# Patient Record
Sex: MALE | Race: WHITE | NOT HISPANIC OR LATINO | Employment: FULL TIME
[De-identification: names, ages, dates, MRNs, and addresses within clinical notes are randomized per-mention and may not be internally consistent; named-entity substitution may affect disease eponyms.]

---

## 2017-01-30 ENCOUNTER — RECORDS - HEALTHEAST (OUTPATIENT)
Dept: ADMINISTRATIVE | Facility: OTHER | Age: 37
End: 2017-01-30

## 2017-06-02 ENCOUNTER — COMMUNICATION - HEALTHEAST (OUTPATIENT)
Dept: FAMILY MEDICINE | Facility: CLINIC | Age: 37
End: 2017-06-02

## 2017-06-02 DIAGNOSIS — I10 HYPERTENSION: ICD-10-CM

## 2017-06-26 ENCOUNTER — COMMUNICATION - HEALTHEAST (OUTPATIENT)
Dept: FAMILY MEDICINE | Facility: CLINIC | Age: 37
End: 2017-06-26

## 2017-06-26 DIAGNOSIS — E78.5 HYPERLIPEMIA: ICD-10-CM

## 2017-08-02 ENCOUNTER — COMMUNICATION - HEALTHEAST (OUTPATIENT)
Dept: FAMILY MEDICINE | Facility: CLINIC | Age: 37
End: 2017-08-02

## 2017-08-02 DIAGNOSIS — K21.9 GERD (GASTROESOPHAGEAL REFLUX DISEASE): ICD-10-CM

## 2017-08-31 ENCOUNTER — COMMUNICATION - HEALTHEAST (OUTPATIENT)
Dept: FAMILY MEDICINE | Facility: CLINIC | Age: 37
End: 2017-08-31

## 2017-08-31 DIAGNOSIS — I10 HTN (HYPERTENSION): ICD-10-CM

## 2017-09-12 ENCOUNTER — COMMUNICATION - HEALTHEAST (OUTPATIENT)
Dept: FAMILY MEDICINE | Facility: CLINIC | Age: 37
End: 2017-09-12

## 2017-09-12 DIAGNOSIS — I10 HYPERTENSION: ICD-10-CM

## 2017-09-30 ENCOUNTER — COMMUNICATION - HEALTHEAST (OUTPATIENT)
Dept: FAMILY MEDICINE | Facility: CLINIC | Age: 37
End: 2017-09-30

## 2017-09-30 DIAGNOSIS — E78.5 HYPERLIPEMIA: ICD-10-CM

## 2017-10-04 ENCOUNTER — COMMUNICATION - HEALTHEAST (OUTPATIENT)
Dept: FAMILY MEDICINE | Facility: CLINIC | Age: 37
End: 2017-10-04

## 2017-10-04 ENCOUNTER — OFFICE VISIT - HEALTHEAST (OUTPATIENT)
Dept: FAMILY MEDICINE | Facility: CLINIC | Age: 37
End: 2017-10-04

## 2017-10-04 DIAGNOSIS — K21.00 REFLUX ESOPHAGITIS: ICD-10-CM

## 2017-10-04 DIAGNOSIS — I10 ESSENTIAL HYPERTENSION: ICD-10-CM

## 2017-10-04 DIAGNOSIS — Z00.00 HEALTH CARE MAINTENANCE: ICD-10-CM

## 2017-10-04 DIAGNOSIS — I10 HYPERTENSION: ICD-10-CM

## 2017-10-04 DIAGNOSIS — E78.5 HYPERLIPIDEMIA: ICD-10-CM

## 2017-10-04 LAB
CHOLEST SERPL-MCNC: 182 MG/DL
FASTING STATUS PATIENT QL REPORTED: YES
HDLC SERPL-MCNC: 40 MG/DL
LDLC SERPL CALC-MCNC: 107 MG/DL
TRIGL SERPL-MCNC: 176 MG/DL

## 2017-10-18 ENCOUNTER — AMBULATORY - HEALTHEAST (OUTPATIENT)
Dept: NURSING | Facility: CLINIC | Age: 37
End: 2017-10-18

## 2017-10-25 ENCOUNTER — COMMUNICATION - HEALTHEAST (OUTPATIENT)
Dept: FAMILY MEDICINE | Facility: CLINIC | Age: 37
End: 2017-10-25

## 2017-11-08 ENCOUNTER — AMBULATORY - HEALTHEAST (OUTPATIENT)
Dept: LAB | Facility: CLINIC | Age: 37
End: 2017-11-08

## 2017-11-08 ENCOUNTER — COMMUNICATION - HEALTHEAST (OUTPATIENT)
Dept: FAMILY MEDICINE | Facility: CLINIC | Age: 37
End: 2017-11-08

## 2017-11-08 DIAGNOSIS — I10 ESSENTIAL HYPERTENSION: ICD-10-CM

## 2017-11-29 ENCOUNTER — COMMUNICATION - HEALTHEAST (OUTPATIENT)
Dept: FAMILY MEDICINE | Facility: CLINIC | Age: 37
End: 2017-11-29

## 2017-11-29 DIAGNOSIS — I10 HTN (HYPERTENSION): ICD-10-CM

## 2017-11-30 ENCOUNTER — COMMUNICATION - HEALTHEAST (OUTPATIENT)
Dept: FAMILY MEDICINE | Facility: CLINIC | Age: 37
End: 2017-11-30

## 2017-11-30 DIAGNOSIS — K21.9 GERD (GASTROESOPHAGEAL REFLUX DISEASE): ICD-10-CM

## 2018-01-18 ENCOUNTER — COMMUNICATION - HEALTHEAST (OUTPATIENT)
Dept: FAMILY MEDICINE | Facility: CLINIC | Age: 38
End: 2018-01-18

## 2018-01-18 DIAGNOSIS — E78.5 HYPERLIPEMIA: ICD-10-CM

## 2018-08-28 ENCOUNTER — COMMUNICATION - HEALTHEAST (OUTPATIENT)
Dept: FAMILY MEDICINE | Facility: CLINIC | Age: 38
End: 2018-08-28

## 2018-08-28 DIAGNOSIS — K21.9 GERD (GASTROESOPHAGEAL REFLUX DISEASE): ICD-10-CM

## 2018-10-02 ENCOUNTER — COMMUNICATION - HEALTHEAST (OUTPATIENT)
Dept: FAMILY MEDICINE | Facility: CLINIC | Age: 38
End: 2018-10-02

## 2018-10-02 DIAGNOSIS — I10 HYPERTENSION: ICD-10-CM

## 2018-10-22 ENCOUNTER — COMMUNICATION - HEALTHEAST (OUTPATIENT)
Dept: FAMILY MEDICINE | Facility: CLINIC | Age: 38
End: 2018-10-22

## 2018-10-22 DIAGNOSIS — E78.5 HYPERLIPEMIA: ICD-10-CM

## 2018-11-07 ENCOUNTER — OFFICE VISIT - HEALTHEAST (OUTPATIENT)
Dept: FAMILY MEDICINE | Facility: CLINIC | Age: 38
End: 2018-11-07

## 2018-11-07 DIAGNOSIS — K21.9 GERD (GASTROESOPHAGEAL REFLUX DISEASE): ICD-10-CM

## 2018-11-07 DIAGNOSIS — Z00.00 HEALTH CARE MAINTENANCE: ICD-10-CM

## 2018-11-07 DIAGNOSIS — E78.5 HYPERLIPIDEMIA: ICD-10-CM

## 2018-11-07 DIAGNOSIS — I10 ESSENTIAL HYPERTENSION: ICD-10-CM

## 2018-11-07 LAB
ALBUMIN SERPL-MCNC: 4.5 G/DL (ref 3.5–5)
ALP SERPL-CCNC: 67 U/L (ref 45–120)
ALT SERPL W P-5'-P-CCNC: 31 U/L (ref 0–45)
ANION GAP SERPL CALCULATED.3IONS-SCNC: 11 MMOL/L (ref 5–18)
AST SERPL W P-5'-P-CCNC: 27 U/L (ref 0–40)
BILIRUB SERPL-MCNC: 0.8 MG/DL (ref 0–1)
BUN SERPL-MCNC: 14 MG/DL (ref 8–22)
CALCIUM SERPL-MCNC: 10.2 MG/DL (ref 8.5–10.5)
CHLORIDE BLD-SCNC: 101 MMOL/L (ref 98–107)
CHOLEST SERPL-MCNC: 196 MG/DL
CO2 SERPL-SCNC: 25 MMOL/L (ref 22–31)
CREAT SERPL-MCNC: 0.87 MG/DL (ref 0.7–1.3)
FASTING STATUS PATIENT QL REPORTED: YES
GFR SERPL CREATININE-BSD FRML MDRD: >60 ML/MIN/1.73M2
GLUCOSE BLD-MCNC: 85 MG/DL (ref 70–125)
HDLC SERPL-MCNC: 42 MG/DL
LDLC SERPL CALC-MCNC: 131 MG/DL
POTASSIUM BLD-SCNC: 4.4 MMOL/L (ref 3.5–5)
PROT SERPL-MCNC: 7.9 G/DL (ref 6–8)
SODIUM SERPL-SCNC: 137 MMOL/L (ref 136–145)
TRIGL SERPL-MCNC: 117 MG/DL

## 2018-11-07 ASSESSMENT — MIFFLIN-ST. JEOR: SCORE: 1831.14

## 2018-11-08 ENCOUNTER — COMMUNICATION - HEALTHEAST (OUTPATIENT)
Dept: FAMILY MEDICINE | Facility: CLINIC | Age: 38
End: 2018-11-08

## 2018-11-08 LAB
25(OH)D3 SERPL-MCNC: 36.5 NG/ML (ref 30–80)
25(OH)D3 SERPL-MCNC: 36.5 NG/ML (ref 30–80)

## 2018-11-23 ENCOUNTER — COMMUNICATION - HEALTHEAST (OUTPATIENT)
Dept: FAMILY MEDICINE | Facility: CLINIC | Age: 38
End: 2018-11-23

## 2018-11-23 DIAGNOSIS — I10 HTN (HYPERTENSION): ICD-10-CM

## 2018-11-30 ENCOUNTER — COMMUNICATION - HEALTHEAST (OUTPATIENT)
Dept: FAMILY MEDICINE | Facility: CLINIC | Age: 38
End: 2018-11-30

## 2018-11-30 DIAGNOSIS — K21.9 GERD (GASTROESOPHAGEAL REFLUX DISEASE): ICD-10-CM

## 2018-12-29 ENCOUNTER — COMMUNICATION - HEALTHEAST (OUTPATIENT)
Dept: FAMILY MEDICINE | Facility: CLINIC | Age: 38
End: 2018-12-29

## 2018-12-29 DIAGNOSIS — I10 HYPERTENSION: ICD-10-CM

## 2019-01-24 ENCOUNTER — COMMUNICATION - HEALTHEAST (OUTPATIENT)
Dept: FAMILY MEDICINE | Facility: CLINIC | Age: 39
End: 2019-01-24

## 2019-01-24 DIAGNOSIS — E78.5 HYPERLIPEMIA: ICD-10-CM

## 2019-01-25 ENCOUNTER — COMMUNICATION - HEALTHEAST (OUTPATIENT)
Dept: FAMILY MEDICINE | Facility: CLINIC | Age: 39
End: 2019-01-25

## 2019-01-26 ENCOUNTER — AMBULATORY - HEALTHEAST (OUTPATIENT)
Dept: FAMILY MEDICINE | Facility: CLINIC | Age: 39
End: 2019-01-26

## 2019-01-26 ENCOUNTER — COMMUNICATION - HEALTHEAST (OUTPATIENT)
Dept: FAMILY MEDICINE | Facility: CLINIC | Age: 39
End: 2019-01-26

## 2019-01-26 DIAGNOSIS — E78.5 HYPERLIPIDEMIA, UNSPECIFIED HYPERLIPIDEMIA TYPE: ICD-10-CM

## 2019-02-01 ENCOUNTER — COMMUNICATION - HEALTHEAST (OUTPATIENT)
Dept: FAMILY MEDICINE | Facility: CLINIC | Age: 39
End: 2019-02-01

## 2019-04-24 ENCOUNTER — COMMUNICATION - HEALTHEAST (OUTPATIENT)
Dept: FAMILY MEDICINE | Facility: CLINIC | Age: 39
End: 2019-04-24

## 2019-04-24 DIAGNOSIS — E78.5 HYPERLIPIDEMIA, UNSPECIFIED HYPERLIPIDEMIA TYPE: ICD-10-CM

## 2019-06-04 ENCOUNTER — COMMUNICATION - HEALTHEAST (OUTPATIENT)
Dept: FAMILY MEDICINE | Facility: CLINIC | Age: 39
End: 2019-06-04

## 2019-06-04 DIAGNOSIS — K21.9 GERD (GASTROESOPHAGEAL REFLUX DISEASE): ICD-10-CM

## 2019-10-22 ENCOUNTER — COMMUNICATION - HEALTHEAST (OUTPATIENT)
Dept: FAMILY MEDICINE | Facility: CLINIC | Age: 39
End: 2019-10-22

## 2019-10-22 DIAGNOSIS — E78.5 HYPERLIPIDEMIA, UNSPECIFIED HYPERLIPIDEMIA TYPE: ICD-10-CM

## 2019-11-20 ENCOUNTER — COMMUNICATION - HEALTHEAST (OUTPATIENT)
Dept: FAMILY MEDICINE | Facility: CLINIC | Age: 39
End: 2019-11-20

## 2019-11-26 ENCOUNTER — COMMUNICATION - HEALTHEAST (OUTPATIENT)
Dept: FAMILY MEDICINE | Facility: CLINIC | Age: 39
End: 2019-11-26

## 2019-11-26 DIAGNOSIS — I10 HTN (HYPERTENSION): ICD-10-CM

## 2019-12-11 ENCOUNTER — COMMUNICATION - HEALTHEAST (OUTPATIENT)
Dept: FAMILY MEDICINE | Facility: CLINIC | Age: 39
End: 2019-12-11

## 2019-12-11 DIAGNOSIS — I10 HYPERTENSION: ICD-10-CM

## 2020-01-06 ENCOUNTER — OFFICE VISIT - HEALTHEAST (OUTPATIENT)
Dept: FAMILY MEDICINE | Facility: CLINIC | Age: 40
End: 2020-01-06

## 2020-01-06 DIAGNOSIS — E78.5 HYPERLIPIDEMIA, UNSPECIFIED HYPERLIPIDEMIA TYPE: ICD-10-CM

## 2020-01-06 DIAGNOSIS — I10 HTN (HYPERTENSION): ICD-10-CM

## 2020-01-06 DIAGNOSIS — K21.9 GASTROESOPHAGEAL REFLUX DISEASE WITHOUT ESOPHAGITIS: ICD-10-CM

## 2020-01-06 ASSESSMENT — MIFFLIN-ST. JEOR: SCORE: 1845.32

## 2020-01-07 ENCOUNTER — COMMUNICATION - HEALTHEAST (OUTPATIENT)
Dept: FAMILY MEDICINE | Facility: CLINIC | Age: 40
End: 2020-01-07

## 2020-01-07 LAB
ALBUMIN SERPL-MCNC: 4.7 G/DL (ref 3.5–5)
ALP SERPL-CCNC: 66 U/L (ref 45–120)
ALT SERPL W P-5'-P-CCNC: 41 U/L (ref 0–45)
ANION GAP SERPL CALCULATED.3IONS-SCNC: 12 MMOL/L (ref 5–18)
AST SERPL W P-5'-P-CCNC: 38 U/L (ref 0–40)
BILIRUB SERPL-MCNC: 0.7 MG/DL (ref 0–1)
BUN SERPL-MCNC: 15 MG/DL (ref 8–22)
CALCIUM SERPL-MCNC: 9.9 MG/DL (ref 8.5–10.5)
CHLORIDE BLD-SCNC: 101 MMOL/L (ref 98–107)
CHOLEST SERPL-MCNC: 178 MG/DL
CO2 SERPL-SCNC: 25 MMOL/L (ref 22–31)
CREAT SERPL-MCNC: 0.96 MG/DL (ref 0.7–1.3)
FASTING STATUS PATIENT QL REPORTED: YES
GFR SERPL CREATININE-BSD FRML MDRD: >60 ML/MIN/1.73M2
GLUCOSE BLD-MCNC: 89 MG/DL (ref 70–125)
HDLC SERPL-MCNC: 42 MG/DL
LDLC SERPL CALC-MCNC: 105 MG/DL
POTASSIUM BLD-SCNC: 4.2 MMOL/L (ref 3.5–5)
PROT SERPL-MCNC: 8 G/DL (ref 6–8)
SODIUM SERPL-SCNC: 138 MMOL/L (ref 136–145)
TRIGL SERPL-MCNC: 154 MG/DL

## 2020-01-21 ENCOUNTER — COMMUNICATION - HEALTHEAST (OUTPATIENT)
Dept: FAMILY MEDICINE | Facility: CLINIC | Age: 40
End: 2020-01-21

## 2020-01-22 ENCOUNTER — AMBULATORY - HEALTHEAST (OUTPATIENT)
Dept: FAMILY MEDICINE | Facility: CLINIC | Age: 40
End: 2020-01-22

## 2020-01-22 DIAGNOSIS — N52.9 ERECTILE DYSFUNCTION, UNSPECIFIED ERECTILE DYSFUNCTION TYPE: ICD-10-CM

## 2020-03-31 ENCOUNTER — OFFICE VISIT - HEALTHEAST (OUTPATIENT)
Dept: FAMILY MEDICINE | Facility: CLINIC | Age: 40
End: 2020-03-31

## 2020-03-31 DIAGNOSIS — J32.9 SINUSITIS, UNSPECIFIED CHRONICITY, UNSPECIFIED LOCATION: ICD-10-CM

## 2020-04-16 ENCOUNTER — COMMUNICATION - HEALTHEAST (OUTPATIENT)
Dept: FAMILY MEDICINE | Facility: CLINIC | Age: 40
End: 2020-04-16

## 2020-04-17 ENCOUNTER — AMBULATORY - HEALTHEAST (OUTPATIENT)
Dept: FAMILY MEDICINE | Facility: CLINIC | Age: 40
End: 2020-04-17

## 2020-04-17 DIAGNOSIS — J32.9 SINUSITIS, UNSPECIFIED CHRONICITY, UNSPECIFIED LOCATION: ICD-10-CM

## 2020-04-17 DIAGNOSIS — J01.90 ACUTE SINUSITIS TREATED WITH ANTIBIOTICS IN THE PAST 60 DAYS: ICD-10-CM

## 2020-05-20 ENCOUNTER — COMMUNICATION - HEALTHEAST (OUTPATIENT)
Dept: FAMILY MEDICINE | Facility: CLINIC | Age: 40
End: 2020-05-20

## 2020-05-20 DIAGNOSIS — K21.9 GERD (GASTROESOPHAGEAL REFLUX DISEASE): ICD-10-CM

## 2020-09-21 ENCOUNTER — COMMUNICATION - HEALTHEAST (OUTPATIENT)
Dept: FAMILY MEDICINE | Facility: CLINIC | Age: 40
End: 2020-09-21

## 2020-09-21 DIAGNOSIS — N52.9 ERECTILE DYSFUNCTION, UNSPECIFIED ERECTILE DYSFUNCTION TYPE: ICD-10-CM

## 2020-09-23 RX ORDER — SILDENAFIL CITRATE 20 MG/1
TABLET ORAL
Qty: 30 TABLET | Refills: 5 | Status: SHIPPED | OUTPATIENT
Start: 2020-09-23 | End: 2021-12-04

## 2020-10-28 ENCOUNTER — COMMUNICATION - HEALTHEAST (OUTPATIENT)
Dept: FAMILY MEDICINE | Facility: CLINIC | Age: 40
End: 2020-10-28

## 2020-10-28 DIAGNOSIS — E78.5 HYPERLIPIDEMIA, UNSPECIFIED HYPERLIPIDEMIA TYPE: ICD-10-CM

## 2021-01-05 ENCOUNTER — COMMUNICATION - HEALTHEAST (OUTPATIENT)
Dept: FAMILY MEDICINE | Facility: CLINIC | Age: 41
End: 2021-01-05

## 2021-01-18 ENCOUNTER — OFFICE VISIT - HEALTHEAST (OUTPATIENT)
Dept: FAMILY MEDICINE | Facility: CLINIC | Age: 41
End: 2021-01-18

## 2021-01-18 ENCOUNTER — COMMUNICATION - HEALTHEAST (OUTPATIENT)
Dept: FAMILY MEDICINE | Facility: CLINIC | Age: 41
End: 2021-01-18

## 2021-01-18 DIAGNOSIS — R00.2 PALPITATIONS: ICD-10-CM

## 2021-01-18 DIAGNOSIS — Z00.00 HEALTH CARE MAINTENANCE: ICD-10-CM

## 2021-01-18 DIAGNOSIS — K21.9 GASTROESOPHAGEAL REFLUX DISEASE WITHOUT ESOPHAGITIS: ICD-10-CM

## 2021-01-18 DIAGNOSIS — I10 ESSENTIAL HYPERTENSION: ICD-10-CM

## 2021-01-18 DIAGNOSIS — E78.5 HYPERLIPIDEMIA, UNSPECIFIED HYPERLIPIDEMIA TYPE: ICD-10-CM

## 2021-01-18 LAB
ALBUMIN SERPL-MCNC: 4.5 G/DL (ref 3.5–5)
ALP SERPL-CCNC: 72 U/L (ref 45–120)
ALT SERPL W P-5'-P-CCNC: 38 U/L (ref 0–45)
ANION GAP SERPL CALCULATED.3IONS-SCNC: 10 MMOL/L (ref 5–18)
AST SERPL W P-5'-P-CCNC: 34 U/L (ref 0–40)
BILIRUB SERPL-MCNC: 0.7 MG/DL (ref 0–1)
BUN SERPL-MCNC: 12 MG/DL (ref 8–22)
CALCIUM SERPL-MCNC: 9.5 MG/DL (ref 8.5–10.5)
CHLORIDE BLD-SCNC: 102 MMOL/L (ref 98–107)
CHOLEST SERPL-MCNC: 155 MG/DL
CO2 SERPL-SCNC: 26 MMOL/L (ref 22–31)
CREAT SERPL-MCNC: 1.03 MG/DL (ref 0.7–1.3)
FASTING STATUS PATIENT QL REPORTED: YES
GFR SERPL CREATININE-BSD FRML MDRD: >60 ML/MIN/1.73M2
GLUCOSE BLD-MCNC: 89 MG/DL (ref 70–125)
HDLC SERPL-MCNC: 40 MG/DL
LDLC SERPL CALC-MCNC: 93 MG/DL
MAGNESIUM SERPL-MCNC: 2.1 MG/DL (ref 1.8–2.6)
POTASSIUM BLD-SCNC: 4.1 MMOL/L (ref 3.5–5)
PROT SERPL-MCNC: 7.6 G/DL (ref 6–8)
SODIUM SERPL-SCNC: 138 MMOL/L (ref 136–145)
TRIGL SERPL-MCNC: 110 MG/DL
TSH SERPL DL<=0.005 MIU/L-ACNC: 2.1 UIU/ML (ref 0.3–5)

## 2021-01-18 RX ORDER — MAGNESIUM 30 MG
30 TABLET ORAL 2 TIMES DAILY
Status: SHIPPED | COMMUNITY
Start: 2021-01-18 | End: 2022-03-08

## 2021-01-18 RX ORDER — SIMVASTATIN 10 MG
10 TABLET ORAL EVERY EVENING
Qty: 90 TABLET | Refills: 1 | Status: SHIPPED | OUTPATIENT
Start: 2021-01-18 | End: 2021-10-09

## 2021-01-18 ASSESSMENT — MIFFLIN-ST. JEOR: SCORE: 1883.08

## 2021-02-02 ENCOUNTER — COMMUNICATION - HEALTHEAST (OUTPATIENT)
Dept: FAMILY MEDICINE | Facility: CLINIC | Age: 41
End: 2021-02-02

## 2021-02-02 DIAGNOSIS — K21.9 GERD (GASTROESOPHAGEAL REFLUX DISEASE): ICD-10-CM

## 2021-02-03 RX ORDER — PANTOPRAZOLE SODIUM 40 MG/1
TABLET, DELAYED RELEASE ORAL
Qty: 90 TABLET | Refills: 3 | Status: SHIPPED | OUTPATIENT
Start: 2021-02-03 | End: 2022-01-28

## 2021-02-18 ENCOUNTER — COMMUNICATION - HEALTHEAST (OUTPATIENT)
Dept: FAMILY MEDICINE | Facility: CLINIC | Age: 41
End: 2021-02-18

## 2021-02-18 DIAGNOSIS — I10 HTN (HYPERTENSION): ICD-10-CM

## 2021-02-18 RX ORDER — METOPROLOL SUCCINATE 50 MG/1
TABLET, EXTENDED RELEASE ORAL
Qty: 90 TABLET | Refills: 3 | Status: SHIPPED | OUTPATIENT
Start: 2021-02-18 | End: 2021-12-22

## 2021-03-02 ENCOUNTER — COMMUNICATION - HEALTHEAST (OUTPATIENT)
Dept: FAMILY MEDICINE | Facility: CLINIC | Age: 41
End: 2021-03-02

## 2021-03-02 DIAGNOSIS — I10 HTN (HYPERTENSION): ICD-10-CM

## 2021-03-02 RX ORDER — LISINOPRIL 20 MG/1
TABLET ORAL
Qty: 90 TABLET | Refills: 3 | Status: SHIPPED | OUTPATIENT
Start: 2021-03-02 | End: 2022-03-08

## 2021-04-20 ENCOUNTER — AMBULATORY - HEALTHEAST (OUTPATIENT)
Dept: NURSING | Facility: CLINIC | Age: 41
End: 2021-04-20

## 2021-05-11 ENCOUNTER — AMBULATORY - HEALTHEAST (OUTPATIENT)
Dept: NURSING | Facility: CLINIC | Age: 41
End: 2021-05-11

## 2021-05-25 ENCOUNTER — RECORDS - HEALTHEAST (OUTPATIENT)
Dept: ADMINISTRATIVE | Facility: CLINIC | Age: 41
End: 2021-05-25

## 2021-05-28 NOTE — TELEPHONE ENCOUNTER
Refill Approved    Rx renewed per Medication Renewal Policy. Medication was last renewed on 1/26/19.    Esperanza Renee, [facility] Triage/Med Refill 4/25/2019     Requested Prescriptions   Pending Prescriptions Disp Refills     simvastatin (ZOCOR) 10 MG tablet [Pharmacy Med Name: SIMVASTATIN 10 MG TABLET] 30 tablet 2     Sig: TAKE 1 TABLET BY MOUTH EVERY DAY IN THE EVENING       Statins Refill Protocol (Hmg CoA Reductase Inhibitors) Passed - 4/24/2019  1:51 AM        Passed - PCP or prescribing provider visit in past 12 months      Last office visit with prescriber/PCP: 11/7/2018 Jeffy Georges MD OR same dept: 11/7/2018 Jeffy Georges MD OR same specialty: 11/7/2018 Jeffy Georges MD  Last physical: 10/4/2017 Last MTM visit: Visit date not found   Next visit within 3 mo: Visit date not found  Next physical within 3 mo: Visit date not found  Prescriber OR PCP: Jeffy Georges MD  Last diagnosis associated with med order: 1. Hyperlipidemia, unspecified hyperlipidemia type  - simvastatin (ZOCOR) 10 MG tablet [Pharmacy Med Name: SIMVASTATIN 10 MG TABLET]; TAKE 1 TABLET BY MOUTH EVERY DAY IN THE EVENING  Dispense: 30 tablet; Refill: 2    If protocol passes may refill for 12 months if within 3 months of last provider visit (or a total of 15 months).

## 2021-05-29 ENCOUNTER — RECORDS - HEALTHEAST (OUTPATIENT)
Dept: ADMINISTRATIVE | Facility: CLINIC | Age: 41
End: 2021-05-29

## 2021-05-29 NOTE — TELEPHONE ENCOUNTER
FYI - Status Update  Who is Calling: Patient  Update: The patient would like this marked as high priority.  Okay to leave a detailed message?:  No return call needed

## 2021-05-31 ENCOUNTER — RECORDS - HEALTHEAST (OUTPATIENT)
Dept: ADMINISTRATIVE | Facility: CLINIC | Age: 41
End: 2021-05-31

## 2021-05-31 VITALS — BODY MASS INDEX: 29.95 KG/M2 | WEIGHT: 208.7 LBS

## 2021-06-01 ENCOUNTER — RECORDS - HEALTHEAST (OUTPATIENT)
Dept: ADMINISTRATIVE | Facility: CLINIC | Age: 41
End: 2021-06-01

## 2021-06-02 VITALS — WEIGHT: 202.8 LBS | BODY MASS INDEX: 29.03 KG/M2 | HEIGHT: 70 IN

## 2021-06-02 NOTE — TELEPHONE ENCOUNTER
Due to be seen    Rx renewed per Medication Renewal Policy. Medication was last renewed on 4/25/19.    Ruba Walker, [facility] Triage/Med Refill 10/22/2019     Requested Prescriptions   Pending Prescriptions Disp Refills     simvastatin (ZOCOR) 10 MG tablet [Pharmacy Med Name: SIMVASTATIN 10 MG TABLET] 90 tablet 1     Sig: TAKE 1 TABLET BY MOUTH EVERY DAY IN THE EVENING       Statins Refill Protocol (Hmg CoA Reductase Inhibitors) Passed - 10/22/2019  1:45 AM        Passed - PCP or prescribing provider visit in past 12 months      Last office visit with prescriber/PCP: 11/7/2018 Jeffy Georges MD OR same dept: 11/7/2018 Jeffy Georges MD OR same specialty: 11/7/2018 Jeffy Georges MD  Last physical: 10/4/2017 Last MTM visit: Visit date not found   Next visit within 3 mo: Visit date not found  Next physical within 3 mo: Visit date not found  Prescriber OR PCP: Jeffy Georges MD  Last diagnosis associated with med order: 1. Hyperlipidemia, unspecified hyperlipidemia type  - simvastatin (ZOCOR) 10 MG tablet [Pharmacy Med Name: SIMVASTATIN 10 MG TABLET]; TAKE 1 TABLET BY MOUTH EVERY DAY IN THE EVENING  Dispense: 90 tablet; Refill: 1    If protocol passes may refill for 12 months if within 3 months of last provider visit (or a total of 15 months).

## 2021-06-03 NOTE — TELEPHONE ENCOUNTER
Refill Given    Refill given per Policy, patient informed they are overdue for Office Visit & B/P  OV 11/7/18    Roxanne Coppola, [facility] Triage/Med Refill 11/27/2019    Requested Prescriptions   Pending Prescriptions Disp Refills     metoprolol succinate (TOPROL-XL) 50 MG 24 hr tablet [Pharmacy Med Name: METOPROLOL SUCC ER 50 MG TAB] 90 tablet 3     Sig: TAKE 1 TABLET BY MOUTH EVERY DAY       Beta-Blockers Refill Protocol Failed - 11/26/2019  4:43 AM        Failed - PCP or prescribing provider visit in past 12 months or next 3 months     Last office visit with prescriber/PCP: 11/7/2018 Jeffy Georges MD OR same dept: Visit date not found OR same specialty: 11/7/2018 Jeffy Georges MD  Last physical: 10/4/2017 Last MTM visit: Visit date not found   Next visit within 3 mo: Visit date not found  Next physical within 3 mo: Visit date not found  Prescriber OR PCP: Jeffy Georges MD  Last diagnosis associated with med order: 1. HTN (hypertension)  - metoprolol succinate (TOPROL-XL) 50 MG 24 hr tablet [Pharmacy Med Name: METOPROLOL SUCC ER 50 MG TAB]; TAKE 1 TABLET BY MOUTH EVERY DAY  Dispense: 90 tablet; Refill: 3    If protocol passes may refill for 12 months if within 3 months of last provider visit (or a total of 15 months).             Failed - Blood pressure filed in past 12 months     BP Readings from Last 1 Encounters:   11/07/18 118/81

## 2021-06-04 VITALS
HEART RATE: 60 BPM | BODY MASS INDEX: 29.6 KG/M2 | WEIGHT: 206.8 LBS | HEIGHT: 70 IN | RESPIRATION RATE: 20 BRPM | SYSTOLIC BLOOD PRESSURE: 131 MMHG | DIASTOLIC BLOOD PRESSURE: 79 MMHG | TEMPERATURE: 99.2 F

## 2021-06-04 NOTE — TELEPHONE ENCOUNTER
RN cannot approve Refill Request    RN can NOT refill this medication Protocol failed and NO refill given. Last office visit: 11/7/2018 Jeffy Georges MD Last Physical: 10/4/2017 Last MTM visit: Visit date not found Last visit same specialty: 11/7/2018 Jeffy Georges MD.  Next visit within 3 mo: Visit date not found  Next physical within 3 mo: Visit date not found  Last refill:  12/29/18      Margaret Yin, [facility] [place] Connection Triage/Med Refill 12/11/2019    Requested Prescriptions   Pending Prescriptions Disp Refills     lisinopril (PRINIVIL,ZESTRIL) 20 MG tablet [Pharmacy Med Name: LISINOPRIL 20 MG TABLET] 90 tablet 0     Sig: Take 1 tablet (20 mg total) by mouth daily.       Ace Inhibitors Refill Protocol Failed - 12/11/2019  4:11 AM        Failed - PCP or prescribing provider visit in past 12 months       Last office visit with prescriber/PCP: 11/7/2018 Jeffy Georges MD OR same dept: Visit date not found OR same specialty: 11/7/2018 Jeffy Georges MD  Last physical: 10/4/2017 Last MTM visit: Visit date not found   Next visit within 3 mo: Visit date not found  Next physical within 3 mo: Visit date not found  Prescriber OR PCP: Jeffy Georges MD  Last diagnosis associated with med order: 1. Hypertension  - lisinopril (PRINIVIL,ZESTRIL) 20 MG tablet [Pharmacy Med Name: LISINOPRIL 20 MG TABLET]; TAKE 1 TABLET BY MOUTH EVERY DAY  Dispense: 90 tablet; Refill: 3    If protocol passes may refill for 12 months if within 3 months of last provider visit (or a total of 15 months).             Failed - Serum Potassium in past 12 months     No results found for: LN-POTASSIUM          Failed - Blood pressure filed in past 12 months     BP Readings from Last 1 Encounters:   11/07/18 118/81             Failed - Serum Creatinine in past 12 months     Creatinine   Date Value Ref Range Status   11/07/2018 0.87 0.70 - 1.30 mg/dL Final

## 2021-06-04 NOTE — PROGRESS NOTES
Assessment:      Healthy male exam.    Encounter Diagnoses   Name Primary?     HTN (hypertension)                         The diagnosis was confirmed.  The condition is stable/controlled on METOPROLOL, LISINOPRIL and no side effects  have been noted.  The appropriate follow up labs  ( CMP )have been ordered  (OR ARE UP TO DATE) and medication refills ordered if requested.          Hyperlipidemia, unspecified hyperlipidemia type                         The diagnosis was confirmed.  The condition is stable/controlled on SIMFASTATIN and no side effects  have been noted.  The appropriate follow up labs  ( CMP, FLP )have been ordered  (OR ARE UP TO DATE) and medication refills ordered if requested.            GERD                         The diagnosis was confirmed.  The condition is stable/controlled on PANTOPRAZOLE and no side effects  have been noted.  The appropriate follow up labs  ( NA---PT NEED A PA FOR THIS MED)have been ordered  (OR ARE UP TO DATE) and medication refills ordered if requested.       Plan:       All questions answered.       Fasting labs    Begin a PA on pantoprazole    Continue present meds without change       Subjective:      Elijah Blood is a 39 y.o. male who presents for an annual exam. The patient reports that there is not domestic violence in his life.   No muscle aches on the simvastatin  No coughing on lisinopril  For his GERD the pantoprazole works well.   He started getting heartburn while on omeprazole    Healthy Habits:   Regular Exercise: Yes  Sunscreen Use: Yes  Healthy Diet: Yes  Dental Visits Regularly: Yes  Seat Belt: Yes  Sexually active: Yes  Monthly Self Testicular Exams:  Yes  Hemoccults: N/A  Flex Sig: N/A  Colonoscopy: N/A  Lipid Profile: Yes  Glucose Screen: Yes  Prevention of Osteoporosis: Yes  Last Dexa: N/A  Guns at Home:  Yes  Guns Safety Locks:  Yes and Gun safe/class:  Yes      Immunization History   Administered Date(s) Administered     DT (pediatric)  01/01/1998, 02/07/2006     Influenza, inj, historic,unspecified 12/09/2019     Influenza, seasonal,quad inj 6-35 mos 10/11/2013     Influenza,seasonal,quad inj =/> 6months 10/06/2015, 10/04/2017, 11/07/2018     Td,adult,historic,unspecified 02/07/2006     Tdap 02/20/2015     Immunization status: up to date and documented.    No exam data present    Current Outpatient Medications   Medication Sig Dispense Refill     aspirin 81 MG EC tablet Take 1 tablet (81 mg total) by mouth daily.       lisinopril (PRINIVIL,ZESTRIL) 20 MG tablet Take 1 tablet (20 mg total) by mouth daily. 90 tablet 3     metoprolol succinate (TOPROL-XL) 50 MG 24 hr tablet Take 1 tablet (50 mg total) by mouth daily. 90 tablet 3     multivit-min/folic/vit K/lycop (MEN'S MULTIVITAMIN ORAL) Take 1 tablet by mouth daily.       pantoprazole (PROTONIX) 40 MG tablet Take 1 tablet (40 mg total) by mouth daily. 90 tablet 1     simvastatin (ZOCOR) 10 MG tablet Take 1 tablet (10 mg total) by mouth every evening. 90 tablet 3     No current facility-administered medications for this visit.      Past Medical History:   Diagnosis Date     GERD (gastroesophageal reflux disease)      Hyperlipidemia      Hypertension      Past Surgical History:   Procedure Laterality Date     CO EXCISION SUBMAXILLARY GLAND      Description: Surgery Excision Of Submandibular (Submaxillary) Gland;  Recorded: 02/09/2010;  Comments: stones     Azithromycin  Family History   Problem Relation Age of Onset     Hypertension Unknown      Hyperlipidemia Unknown      Coronary artery disease Neg Hx      Social History     Socioeconomic History     Marital status:      Spouse name: Not on file     Number of children: Not on file     Years of education: Not on file     Highest education level: Not on file   Occupational History     Not on file   Social Needs     Financial resource strain: Not on file     Food insecurity:     Worry: Not on file     Inability: Not on file     Transportation  "needs:     Medical: Not on file     Non-medical: Not on file   Tobacco Use     Smoking status: Never Smoker     Smokeless tobacco: Never Used   Substance and Sexual Activity     Alcohol use: Yes     Comment: occasional use     Drug use: No     Sexual activity: Not on file   Lifestyle     Physical activity:     Days per week: Not on file     Minutes per session: Not on file     Stress: Not on file   Relationships     Social connections:     Talks on phone: Not on file     Gets together: Not on file     Attends [employer] service: Not on file     Active member of club or organization: Not on file     Attends meetings of clubs or organizations: Not on file     Relationship status: Not on file     Intimate partner violence:     Fear of current or ex partner: Not on file     Emotionally abused: Not on file     Physically abused: Not on file     Forced sexual activity: Not on file   Other Topics Concern     Not on file   Social History Narrative     Not on file       Review of Systems  General:  Denies problem  Eyes: Denies problem  Ears/Nose/Throat: Denies problem  Cardiovascular: Denies problem  Respiratory:  Denies problem  Gastrointestinal:  Denies problem  Genitourinary: Denies problem  Musculoskeletal:  Denies problem  Skin: Denies problem  Neurologic: Denies problem  Psychiatric: Denies problem  Endocrine: Denies problem  Heme/Lymphatic: Denies problem   Allergic/Immunologic: Denies problem        Objective:     Vitals:    01/06/20 1030   BP: 131/79   Pulse: 60   Resp: 20   Temp: 99.2  F (37.3  C)   TempSrc: Oral   Weight: 206 lb 12.8 oz (93.8 kg)   Height: 5' 9.75\" (1.772 m)     Wt Readings from Last 3 Encounters:   01/06/20 206 lb 12.8 oz (93.8 kg)   11/07/18 202 lb 12.8 oz (92 kg)   10/04/17 208 lb 11.2 oz (94.7 kg)      Body mass index is 29.89 kg/m .    Physical  General Appearance: Alert, cooperative, no distress, appears stated age  Head: Normocephalic, without obvious abnormality, atraumatic  Eyes: PERRL, " conjunctiva/corneas clear, EOM's intact  Ears: Normal TM's and external ear canals, both ears  Nose: Nares normal, septum midline,mucosa normal, no drainage  Throat: Lips, mucosa, and tongue normal; teeth and gums normal  Neck: Supple, symmetrical, trachea midline, no adenopathy;  thyroid: not enlarged, symmetric, no tenderness/mass/nodules; no carotid bruit or JVD  Back: Symmetric, no curvature, ROM normal, no CVA tenderness  Lungs: Clear to auscultation bilaterally, respirations unlabored  Heart: Regular rate and rhythm, S1 and S2 normal, no murmur, rub, or gallop,  Abdomen: Soft, non-tender, bowel sounds active all four quadrants,  no masses, no organomegaly  Genitourinary: Penis normal. Right testis is descended. Left testis is descended.   PROSTATE SMOOTH SYMMETRIC WITHOUT NODULARITY, TX, OR FIRMNESS  Musculoskeletal: Normal range of motion. No joint swelling or deformity.   Extremities: Extremities normal, atraumatic, no cyanosis or edema  Skin: Skin color, texture, turgor normal, no rashes or lesions  Lymph nodes: Cervical, supraclavicular, and axillary nodes normal  Neurologic: He is alert. He has normal reflexes.   Psychiatric: He has a normal mood and affect.      Vitamin D, Total (25-Hydroxy)   Date Value Ref Range Status   11/07/2018 36.5 30.0 - 80.0 ng/mL Final      Results for orders placed or performed in visit on 11/07/18   Comprehensive Metabolic Panel   Result Value Ref Range    Sodium 137 136 - 145 mmol/L    Potassium 4.4 3.5 - 5.0 mmol/L    Chloride 101 98 - 107 mmol/L    CO2 25 22 - 31 mmol/L    Anion Gap, Calculation 11 5 - 18 mmol/L    Glucose 85 70 - 125 mg/dL    BUN 14 8 - 22 mg/dL    Creatinine 0.87 0.70 - 1.30 mg/dL    GFR MDRD Af Amer >60 >60 mL/min/1.73m2    GFR MDRD Non Af Amer >60 >60 mL/min/1.73m2    Bilirubin, Total 0.8 0.0 - 1.0 mg/dL    Calcium 10.2 8.5 - 10.5 mg/dL    Protein, Total 7.9 6.0 - 8.0 g/dL    Albumin 4.5 3.5 - 5.0 g/dL    Alkaline Phosphatase 67 45 - 120 U/L    AST 27  0 - 40 U/L    ALT 31 0 - 45 U/L     Lab Results   Component Value Date    CHOL 196 11/07/2018    CHOL 182 10/04/2017    CHOL 185 07/20/2016     Lab Results   Component Value Date    HDL 42 11/07/2018    HDL 40 10/04/2017    HDL 43 07/20/2016     Lab Results   Component Value Date    LDLCALC 131 (H) 11/07/2018    LDLCALC 107 10/04/2017    LDLCALC 112 07/20/2016     Lab Results   Component Value Date    TRIG 117 11/07/2018    TRIG 176 (H) 10/04/2017    TRIG 148 07/20/2016     No components found for: CHOLHDL

## 2021-06-05 VITALS
WEIGHT: 214.25 LBS | HEIGHT: 70 IN | DIASTOLIC BLOOD PRESSURE: 79 MMHG | SYSTOLIC BLOOD PRESSURE: 136 MMHG | BODY MASS INDEX: 30.67 KG/M2 | HEART RATE: 60 BPM | RESPIRATION RATE: 16 BRPM | TEMPERATURE: 97.5 F

## 2021-06-05 NOTE — TELEPHONE ENCOUNTER
Central PA team  [phone]  Pool: HE PA MED ([id number])          PA has been initiated.       PA form completed and faxed insurance via Cover My Meds     Key:  Manually faxed      Medication:  Pantoprazole    Insurance:  [employer]        Response will be received via fax and may take up to 5-10 business days depending on plan

## 2021-06-05 NOTE — TELEPHONE ENCOUNTER
Left message to call back for: PA response  Information to relay to patient:  Please let pt know PA was denied due to PPI's no longer being covered under his plan. See explanation below.

## 2021-06-05 NOTE — TELEPHONE ENCOUNTER
Who is calling:  Patient   Reason for Call:  Relayed msg and patient agrees. Patient states: he will pay for medications out -of - pocket and follow up as needed.  Date of last appointment with primary care: 01/06/20  Okay to leave a detailed message: Yes

## 2021-06-05 NOTE — TELEPHONE ENCOUNTER
Prior Authorization Request  Who s requesting:  Patient  Pharmacy Name and Location: [facility] in [place], [place] #[id number]  Medication Name: Pantoprazole 40 mg  Insurance Plan: [employer]  Insurance Member ID Number: [id number]  CoverMyMeds Key: N/A  Informed patient that prior authorizations can take up to 10 business days for response:   Yes  Okay to leave a detailed message: Yes    Please send PA to patient's insurance. Omeprazole results in breakthrough pain.

## 2021-06-07 NOTE — PATIENT INSTRUCTIONS - HE
Start Augmentin 875 mg twice daily 10 days.    Flonase 2 sprays daily in each nostril for 2 weeks.    Use a probiotic while on the Augmentin

## 2021-06-07 NOTE — PROGRESS NOTES
"Elijah Blood is a 39 y.o. male who is being evaluated via a billable telephone visit.      The patient has been notified of following:     \"This telephone visit will be conducted via a call between you and your physician/provider. We have found that certain health care needs can be provided without the need for a physical exam.  This service lets us provide the care you need with a short phone conversation.  If a prescription is necessary we can send it directly to your pharmacy.  If lab work is needed we can place an order for that and you can then stop by our lab to have the test done at a later time.    If during the course of the call the physician/provider feels a telephone visit is not appropriate, you will not be charged for this service.\"     Patient has given verbal consent to a Telephone visit? Yes    Elijah Blood complains of    Chief Complaint   Patient presents with     Sinus Problem     Runny nose last week.  Tuesday started not feeling well and then headache since then.  Lots of pressure in his eyes.  Cheek bones and above eyes are tender.  Ears are plugged.      HPI:  Last week onset runny nose.  Pressure in eyes and headache.  Cheek bones and around the eyes is tender.  Ears ringing and pressure.  No fever or cough.  Nose pluggy but can still breathe.  Some upper molar pain    I have reviewed and updated the patient's Past Medical History, Social History, Family History and Medication List.    ALLERGIES  Azithromycin    Additional provider notes:  na    Assessment/Plan:  Encounter Diagnosis   Name Primary?     Sinusitis, unspecified chronicity, unspecified location Yes       PLAN:   Start Augmentin 875 mg twice daily 10 days.    Flonase 2 sprays daily in each nostril for 2 weeks.\    Phone call duration:  9:28 minutes    Jeffy Georges MD    "

## 2021-06-08 NOTE — TELEPHONE ENCOUNTER
Refill Approved    Rx renewed per Medication Renewal Policy. Medication was last renewed on 6/4/19.    Catherine Mcpherson, [facility] Triage/Med Refill 5/23/2020     Requested Prescriptions   Pending Prescriptions Disp Refills     pantoprazole (PROTONIX) 40 MG tablet [Pharmacy Med Name: Pantoprazole Sodium Oral Tablet Delayed Release 40 MG] 90 tablet 0     Sig: TAKE ONE TABLET BY MOUTH ONE TIME DAILY       GI Medications Refill Protocol Passed - 5/20/2020 10:41 AM        Passed - PCP or prescribing provider visit in last 12 or next 3 months.     Last office visit with prescriber/PCP: 11/7/2018 Jeffy Georges MD OR same dept: Visit date not found OR same specialty: 11/7/2018 Jeffy Georges MD  Last physical: 1/6/2020 Last MTM visit: Visit date not found   Next visit within 3 mo: Visit date not found  Next physical within 3 mo: Visit date not found  Prescriber OR PCP: Jeffy Georges MD  Last diagnosis associated with med order: 1. GERD (gastroesophageal reflux disease)  - pantoprazole (PROTONIX) 40 MG tablet [Pharmacy Med Name: Pantoprazole Sodium Oral Tablet Delayed Release 40 MG]; TAKE ONE TABLET BY MOUTH ONE TIME DAILY   Dispense: 90 tablet; Refill: 0    If protocol passes may refill for 12 months if within 3 months of last provider visit (or a total of 15 months).

## 2021-06-11 NOTE — TELEPHONE ENCOUNTER
Refill Approved    Rx renewed per Medication Renewal Policy. Medication was last renewed on 1/22/20.    Ruba Walker, [facility] Triage/Med Refill 9/23/2020     Requested Prescriptions   Pending Prescriptions Disp Refills     sildenafil (REVATIO) 20 mg tablet [Pharmacy Med Name: SILDENAFIL 20 MG TABLET] 30 tablet 0     Sig: TAKE ONE TO THREE TABS ORALLY 30 MIN TO FOUR HOURS PRIOR TO INTERCOURSE       Medications for Impotence Refill Protocol Passed - 9/21/2020  9:37 AM        Passed - PCP or prescribing provider visit in last year     Last office visit with prescriber/PCP: 11/7/2018 Jeffy Georges MD OR same dept: Visit date not found OR same specialty: 11/7/2018 Jeffy Georges MD  Last physical: 1/6/2020 Last MTM visit: Visit date not found   Next visit within 3 mo: Visit date not found  Next physical within 3 mo: Visit date not found  Prescriber OR PCP: Jeffy Georges MD  Last diagnosis associated with med order: 1. Erectile dysfunction, unspecified erectile dysfunction type  - sildenafil (REVATIO) 20 mg tablet [Pharmacy Med Name: SILDENAFIL 20 MG TABLET]; Take one to three tabs orally 30 min to four hours prior to intercourse  Dispense: 30 tablet; Refill: 0    If protocol passes may refill for 12 months if within 3 months of last provider visit (or a total of 15 months).

## 2021-06-12 NOTE — TELEPHONE ENCOUNTER
Refill Approved    Rx renewed per Medication Renewal Policy. Medication was last renewed on 1/6/20.    Ruba Walker, [facility] Connection Triage/Med Refill 10/29/2020     Requested Prescriptions   Pending Prescriptions Disp Refills     simvastatin (ZOCOR) 10 MG tablet 90 tablet 3     Sig: Take 1 tablet (10 mg total) by mouth every evening.       Statins Refill Protocol (Hmg CoA Reductase Inhibitors) Passed - 10/28/2020  3:33 PM        Passed - PCP or prescribing provider visit in past 12 months      Last office visit with prescriber/PCP: 11/7/2018 Jeffy Georges MD OR same dept: Visit date not found OR same specialty: 11/7/2018 Jeffy Georges MD  Last physical: 1/6/2020 Last MTM visit: Visit date not found   Next visit within 3 mo: Visit date not found  Next physical within 3 mo: Visit date not found  Prescriber OR PCP: Jeffy Georges MD  Last diagnosis associated with med order: 1. Hyperlipidemia, unspecified hyperlipidemia type  - simvastatin (ZOCOR) 10 MG tablet; Take 1 tablet (10 mg total) by mouth every evening.  Dispense: 90 tablet; Refill: 3    If protocol passes may refill for 12 months if within 3 months of last provider visit (or a total of 15 months).

## 2021-06-13 NOTE — PROGRESS NOTES
I met with Elijah Blood at the request of Jeffy Georges MD to recheck his blood pressure.  Blood pressure medications on the MAR were reviewed with patient.    Patient has taken all medications as per usual regimen: Yes  Patient reports tolerating them without any issues or concerns: Yes    Vitals:    10/18/17 0924   BP: 130/72   Patient Site: Left Arm   Patient Position: Sitting   Cuff Size: Adult Large   Pulse: (!) 58       Blood pressure was taken, previous encounter was reviewed, recorded blood pressure below 140/90.  Patient was discharged and the note will be sent to the provider for final review.

## 2021-06-13 NOTE — PROGRESS NOTES
DIAGNOSIS:  1. Chronic Reflux Esophagitis     2. Hypertension  lisinopril (PRINIVIL,ZESTRIL) 20 MG tablet    DISCONTINUED: lisinopril (PRINIVIL,ZESTRIL) 10 MG tablet   3. Hyperlipidemia  Lipid [place] FASTING    Comprehensive Metabolic Panel   4. Hypertension  Basic Metabolic Panel   5. Health care maintenance  Influenza, Seasonal,Quad Inj, 36+ MOS       PLAN:  Patient Instructions   FASTING LABS    FLU VACCINE    Increase lisinopril to 20 mg daily  Nurse BP CHECK one week after change  Labs 3 weeks after change        HPI:  GERD with fairly good control on PPI.  HEART PALPITATIONS UNDER GOOD CONTROL ON METOPROLOL.  HOME BP'S 122/78 AT NIGHT.    ON /82          Current Outpatient Prescriptions on File Prior to Visit   Medication Sig Dispense Refill     aspirin 81 MG EC tablet Take 1 tablet (81 mg total) by mouth daily.       metoprolol succinate (TOPROL-XL) 50 MG 24 hr tablet TAKE ONE TABLET BY MOUTH ONE TIME DAILY 90 tablet 0     pantoprazole (PROTONIX) 40 MG tablet Take 1 tablet (40 mg total) by mouth daily. You will need to be seen for further refills. 60 tablet 1     pravastatin (PRAVACHOL) 10 MG tablet TAKE ONE TABLET BY MOUTH ONE TIME DAILY 90 tablet 0     [DISCONTINUED] lisinopril (PRINIVIL,ZESTRIL) 10 MG tablet TAKE ONE TABLET BY MOUTH ONE TIME DAILY 30 tablet 0     [DISCONTINUED] OMEGA-3/DHA/EPA/FISH OIL (FISH OIL-OMEGA-3 FATTY ACIDS) 300-1,000 mg capsule Take 2 g by mouth daily.       No current facility-administered medications on file prior to visit.        Pmh: reviewed  Psh: reviewed  Allergy:  reviewed      EXAM:    /82 (Patient Site: Right Arm, Patient Position: Sitting, Cuff Size: Adult Regular)  Pulse (!) 58  Temp 98.6  F (37  C) (Oral)   Resp 16  Wt 208 lb 11.2 oz (94.7 kg)  BMI 29.95 kg/m2  GEN:   ALERT, NAD, ORIENTED TIMES THREE  NECK: SUPPLE WITHOUT ADENOPATHY OR THYROMEGALY  LUNGS: CTA  COR: RRR WITHOUT MURMUR  SKIN: NO RASH , ULCERS OR LESIONS NOTED  EXT: WITHOUT EDEMA OR  SWELLING    No results found for this or any previous visit (from the past 168 hour(s)).       Results for orders placed or performed in visit on 07/20/16   Comprehensive Metabolic Panel   Result Value Ref Range    Sodium 138 136 - 145 mmol/L    Potassium 4.3 3.5 - 5.0 mmol/L    Chloride 102 98 - 107 mmol/L    CO2 25 22 - 31 mmol/L    Anion Gap, Calculation 11 5 - 18 mmol/L    Glucose 86 70 - 125 mg/dL    BUN 13 8 - 22 mg/dL    Creatinine 0.86 0.70 - 1.30 mg/dL    GFR MDRD Af Amer >60 >60 mL/min/1.73m2    GFR MDRD Non Af Amer >60 >60 mL/min/1.73m2    Bilirubin, Total 1.0 0.0 - 1.0 mg/dL    Calcium 9.7 8.5 - 10.5 mg/dL    Protein, Total 7.8 6.0 - 8.0 g/dL    Albumin 3.9 3.5 - 5.0 g/dL    Alkaline Phosphatase 73 45 - 120 U/L    AST 26 0 - 40 U/L    ALT 22 0 - 45 U/L

## 2021-06-14 NOTE — TELEPHONE ENCOUNTER
Refill Approved    Rx renewed per Medication Renewal Policy. Medication was last renewed on 5/23/20.    Ruba Walker, [facility] Triage/Med Refill 2/3/2021     Requested Prescriptions   Pending Prescriptions Disp Refills     pantoprazole (PROTONIX) 40 MG tablet [Pharmacy Med Name: Pantoprazole Sodium Oral Tablet Delayed Release 40 MG] 90 tablet 0     Sig: TAKE ONE TABLET BY MOUTH ONE TIME DAILY       GI Medications Refill Protocol Passed - 2/2/2021  4:24 PM        Passed - PCP or prescribing provider visit in last 12 or next 3 months.     Last office visit with prescriber/PCP: 11/7/2018 Jeffy Georges MD OR same dept: Visit date not found OR same specialty: 11/7/2018 Jeffy Georges MD  Last physical: 1/18/2021 Last MTM visit: Visit date not found   Next visit within 3 mo: Visit date not found  Next physical within 3 mo: Visit date not found  Prescriber OR PCP: Jeffy Georges MD  Last diagnosis associated with med order: 1. GERD (gastroesophageal reflux disease)  - pantoprazole (PROTONIX) 40 MG tablet [Pharmacy Med Name: Pantoprazole Sodium Oral Tablet Delayed Release 40 MG]; TAKE ONE TABLET BY MOUTH ONE TIME DAILY   Dispense: 90 tablet; Refill: 0    If protocol passes may refill for 12 months if within 3 months of last provider visit (or a total of 15 months).

## 2021-06-14 NOTE — PATIENT INSTRUCTIONS - HE
Fasting labs    If recurring palpitations then call and we angel set up a Cardiac Event Monitor.    Continue ACE for HTN  Continue PPI for GERD  Continue STATIN for elevated cholesterol

## 2021-06-14 NOTE — PROGRESS NOTES
MALE PREVENTATIVE EXAM    Assessment and Plan:       1. Health care maintenance      2. Hyperlipidemia, unspecified hyperlipidemia type  Stable on and tolerating statin  - simvastatin (ZOCOR) 10 MG tablet; Take 1 tablet (10 mg total) by mouth every evening.  Dispense: 90 tablet; Refill: 1  - Comprehensive Metabolic Panel  - Lipid Profile    3. Hypertension  Controlled on ACE    4. Gastroesophageal reflux disease without esophagitis  Controlled on PPI    5. Palpitations    - Magnesium  - Thyroid Cascade    PLAN:   Fasting labs     If recurring palpitations then call and we angel set up a Cardiac Event Monitor.      Next follow up:  No follow-ups on file.    Immunization Review  Adult Imm Review: No immunizations due today  no tobacco or alcohol    I discussed the following with the patient:   Adult Healthy Living: Importance of regular exercise  Healthy nutrition    I have had an Advance Directives discussion with the patient.    Subjective:   Chief Complaint: Elijah Blood is an 40 y.o. male here for a preventative health visit.     HPI:   Had been having like a heart flutter that resolved with magnesium.  Hasn't felt one flutter for at least a week.     Gets pressure feeling after eating certain foods.  Gas-x seems to help.  No exertional chest pain.     Healthy Habits  Are you taking a daily aspirin? Yes  Do you typically exercising at least 40 min, 3-4 times per week?  Yes  Do you usually eat at least 4 servings of fruit and vegetables a day, include whole grains and fiber and avoid regularly eating high fat foods? Yes  Have you had an eye exam in the past two years? Yes  Do you see a dentist twice per year? Yes  Do you have any concerns regarding sleep? No    Safety Screen  If you own firearms, are they secured in a locked gun cabinet or with trigger locks? Yes  Do you feel you are safe where you are living?: Yes (1/18/2021  7:24 AM)  Do you feel you are safe in your relationship(s)?: Yes (1/18/2021  7:24  "AM)      Review of Systems:   No heartburn issues   Please see above.  The rest of the review of systems are negative for all systems.     Cancer Screening     Patient has no health maintenance due at this time          Patient Care Team:  Jeffy Georges MD as PCP - General (Family Medicine)  Jeffy Georges MD as Assigned PCP        History     Reviewed By Date/Time Sections Reviewed    Corine Garcia CMA 1/18/2021  7:20 AM Tobacco            Objective:   Vital Signs:   Visit Vitals  /79   Pulse 60   Temp 97.5  F (36.4  C) (Oral)   Resp 16   Ht 5' 10\" (1.778 m)   Wt 214 lb 4 oz (97.2 kg)   BMI 30.74 kg/m           PHYSICAL EXAM      General Appearance:    Alert, cooperative, no distress, appears stated age   Head:    Normocephalic, without obvious abnormality, atraumatic   Eyes:    PERRL, conjunctiva/corneas clear, EOM's intact, fundi     benign, both eyes        Ears:    Normal TM's and external ear canals, both ears   Nose:   Nares normal, septum midline, mucosa normal, no drainage    or sinus tenderness   Throat:   Lips, mucosa, and tongue normal; teeth and gums normal   Neck:   Supple, symmetrical, trachea midline, no adenopathy;        thyroid:  No enlargement/tenderness/nodules; no carotid    bruit or JVD   Back:     Symmetric, no curvature, ROM normal, no CVA tenderness   Lungs:     Clear to auscultation bilaterally, respirations unlabored   Chest wall:    No tenderness or deformity   Heart:    Regular rate and rhythm, S1 and S2 normal, no murmur, rub    or gallop   Abdomen:     Soft, non-tender, bowel sounds active all four quadrants,     no masses, no organomegaly   Genitalia:    Normal male without lesion, discharge or tenderness   Rectal:    Normal tone, normal prostate, no masses or tenderness, nodularity, asymmetry or firmness;       Extremities:   Extremities normal, atraumatic, no cyanosis or edema       Skin:   Skin color, texture, turgor normal, no rashes or lesions         "               Additional Screenings Completed Today:

## 2021-06-15 NOTE — TELEPHONE ENCOUNTER
Refill Approved    Rx renewed per Medication Renewal Policy. Medication was last renewed on 1/6/20.    Esperanza Renee, [facility] Triage/Med Refill 2/18/2021     Requested Prescriptions   Pending Prescriptions Disp Refills     metoprolol succinate (TOPROL-XL) 50 MG 24 hr tablet [Pharmacy Med Name: METOPROLOL SUCC ER 50 MG TAB] 90 tablet 3     Sig: TAKE 1 TABLET BY MOUTH EVERY DAY       Beta-Blockers Refill Protocol Passed - 2/18/2021 12:29 PM        Passed - PCP or prescribing provider visit in past 12 months or next 3 months     Last office visit with prescriber/PCP: 11/7/2018 Jeffy Georges MD OR same dept: Visit date not found OR same specialty: 11/7/2018 Jeffy Georges MD  Last physical: 1/18/2021 Last MTM visit: Visit date not found   Next visit within 3 mo: Visit date not found  Next physical within 3 mo: Visit date not found  Prescriber OR PCP: Jeffy Georges MD  Last diagnosis associated with med order: 1. HTN (hypertension)  - metoprolol succinate (TOPROL-XL) 50 MG 24 hr tablet [Pharmacy Med Name: METOPROLOL SUCC ER 50 MG TAB]; TAKE 1 TABLET BY MOUTH EVERY DAY  Dispense: 90 tablet; Refill: 3    If protocol passes may refill for 12 months if within 3 months of last provider visit (or a total of 15 months).             Passed - Blood pressure filed in past 12 months     BP Readings from Last 1 Encounters:   01/18/21 136/79

## 2021-06-15 NOTE — TELEPHONE ENCOUNTER
Refill Approved    Rx renewed per Medication Renewal Policy. Medication was last renewed on 01/06/2020.  Last office visit was 01/18/2021 with PCP.    Maribel Munoz, [facility] Triage/Med Refill 3/2/2021     Requested Prescriptions   Pending Prescriptions Disp Refills     lisinopriL (PRINIVIL,ZESTRIL) 20 MG tablet [Pharmacy Med Name: LISINOPRIL 20 MG TABLET] 90 tablet 3     Sig: TAKE 1 TABLET BY MOUTH EVERY DAY       Ace Inhibitors Refill Protocol Passed - 3/2/2021  9:22 AM        Passed - PCP or prescribing provider visit in past 12 months       Last office visit with prescriber/PCP: 11/7/2018 Jeffy Georges MD OR same dept: Visit date not found OR same specialty: 11/7/2018 Jeffy Georges MD  Last physical: 1/18/2021 Last MTM visit: Visit date not found   Next visit within 3 mo: Visit date not found  Next physical within 3 mo: Visit date not found  Prescriber OR PCP: Jeffy Georges MD  Last diagnosis associated with med order: 1. HTN (hypertension)  - lisinopriL (PRINIVIL,ZESTRIL) 20 MG tablet [Pharmacy Med Name: LISINOPRIL 20 MG TABLET]; TAKE 1 TABLET BY MOUTH EVERY DAY  Dispense: 90 tablet; Refill: 3    If protocol passes may refill for 12 months if within 3 months of last provider visit (or a total of 15 months).             Passed - Serum Potassium in past 12 months     Lab Results   Component Value Date    Potassium 4.1 01/18/2021             Passed - Blood pressure filed in past 12 months     BP Readings from Last 1 Encounters:   01/18/21 136/79             Passed - Serum Creatinine in past 12 months     Creatinine   Date Value Ref Range Status   01/18/2021 1.03 0.70 - 1.30 mg/dL Final

## 2021-06-16 NOTE — TELEPHONE ENCOUNTER
Telephone Encounter by Thalia Means at 1/8/2020  1:34 PM     Author: Thalia Means Service: -- Author Type: --    Filed: 1/8/2020  1:36 PM Encounter Date: 1/7/2020 Status: Signed    : Thalia Means       PRIOR AUTHORIZATION DENIED    Denial Rational: Per insurance Medication is now excluded from benefits.  PPIs are no longer covered under plan.

## 2021-06-19 NOTE — LETTER
Letter by Jeffy Georges MD at      Author: Jeffy Georges MD Service: -- Author Type: --    Filed:  Encounter Date: 2019 Status: Signed         Elijah Blood  [address]  [place] [address]      2019      Dear Elijah Blood,   : 1980      This letter is in regards to the appointment that you had scheduled on  at the [facility] with Dr. Georges.     The [facility] strives to see all patients in a timely manner and we need your help to achieve this.  The above-mentioned appointment was missed and we do not have record of a cancellation by you.  Whenever possible, we request appointment cancellations at least 72 hours in advance.  This time allows us to offer the appointment to another patient in need.      If you feel you have received this letter in error, or if you need to reschedule this appointment, please call our office so that we may update our records.      Sincerely,    [facility], [place], operated by [facility]

## 2021-06-20 NOTE — LETTER
Letter by Jeffy Georges MD at      Author: Jeffy Georges MD Service: -- Author Type: --    Filed:  Encounter Date: 1/7/2020 Status: Signed         Elijah Blood  [address]   [place] [address]             January 7, 2020         Dear Mr. Blood,    Below are the results from your recent visit:    Resulted Orders   Lipid [place] FASTING   Result Value Ref Range    Cholesterol 178 <=199 mg/dL    Triglycerides 154 (H) <=149 mg/dL    HDL Cholesterol 42 >=40 mg/dL    LDL Calculated 105 <=129 mg/dL    Patient Fasting > 8hrs? Yes    Comprehensive Metabolic Panel   Result Value Ref Range    Sodium 138 136 - 145 mmol/L    Potassium 4.2 3.5 - 5.0 mmol/L    Chloride 101 98 - 107 mmol/L    CO2 25 22 - 31 mmol/L    Anion Gap, Calculation 12 5 - 18 mmol/L    Glucose 89 70 - 125 mg/dL    BUN 15 8 - 22 mg/dL    Creatinine 0.96 0.70 - 1.30 mg/dL    GFR MDRD Af Amer >60 >60 mL/min/1.73m2    GFR MDRD Non Af Amer >60 >60 mL/min/1.73m2    Bilirubin, Total 0.7 0.0 - 1.0 mg/dL    Calcium 9.9 8.5 - 10.5 mg/dL    Protein, Total 8.0 6.0 - 8.0 g/dL    Albumin 4.7 3.5 - 5.0 g/dL    Alkaline Phosphatase 66 45 - 120 U/L    AST 38 0 - 40 U/L    ALT 41 0 - 45 U/L    Narrative    Fasting Glucose reference range is 70-99 mg/dL per  American Diabetes Association (ADA) guidelines.       Luis Nava:  Your triglycerides are negligibly elevated.  The cholesterol panel looks very good and the remaining labs are NORMAL.    Please call with questions or contact us using [facility].    Sincerely,        Electronically signed by Jeffy Georges MD

## 2021-06-21 NOTE — PROGRESS NOTES
"DIAGNOSIS:  1. Health care maintenance  Influenza, Seasonal,Quad Inj, 36+ MOS    Vitamin D, Total (25-Hydroxy)   2. Hypertension     3. Hyperlipidemia  Lipid [place] FASTING    Comprehensive Metabolic Panel   4. GERD (gastroesophageal reflux disease)         PLAN:  Patient Instructions   Flu vaccine    Check CMP and Lipids and Vit D    Continue present meds and MVI        HPI:  Patience.   Continues to need and benefit from protonix.         Current Outpatient Prescriptions on File Prior to Visit   Medication Sig Dispense Refill     aspirin 81 MG EC tablet Take 1 tablet (81 mg total) by mouth daily.       lisinopril (PRINIVIL,ZESTRIL) 20 MG tablet Take 1 tablet (20 mg total) by mouth daily. TAKE ONE TABLET BY MOUTH ONE TIME DAILY 90 tablet 0     metoprolol succinate (TOPROL-XL) 50 MG 24 hr tablet TAKE ONE TABLET BY MOUTH ONE TIME DAILY 90 tablet 3     pantoprazole (PROTONIX) 40 MG tablet TAKE 1 TABLET (40 MG TOTAL) BY MOUTH DAILY 90 tablet 0     pravastatin (PRAVACHOL) 10 MG tablet Take 1 tablet (10 mg total) by mouth daily. 90 tablet 0     No current facility-administered medications on file prior to visit.        Pmh: reviewed  Psh: reviewed  Allergy:  reviewed      EXAM:    /81  Pulse 61  Resp 18  Ht 5' 10\" (1.778 m)  Wt 202 lb 12.8 oz (92 kg)  BMI 29.1 kg/m2  GEN:   ALERT, NAD, ORIENTED TIMES THREE  NECK: SUPPLE WITHOUT ADENOPATHY OR THYROMEGALY  LUNGS: CTA  COR: RRR WITHOUT MURMUR  SKIN: NO RASH , ULCERS OR LESIONS NOTED  EXT: WITHOUT EDEMA OR SWELLING    No results found for this or any previous visit (from the past 168 hour(s)).  Results for orders placed or performed in visit on 10/04/17   Comprehensive Metabolic Panel   Result Value Ref Range    Sodium 139 136 - 145 mmol/L    Potassium 4.6 3.5 - 5.0 mmol/L    Chloride 102 98 - 107 mmol/L    CO2 27 22 - 31 mmol/L    Anion Gap, Calculation 10 5 - 18 mmol/L    Glucose 90 70 - 125 mg/dL    BUN 14 8 - 22 mg/dL    Creatinine 0.91 0.70 - 1.30 mg/dL    " GFR MDRD Af Amer >60 >60 mL/min/1.73m2    GFR MDRD Non Af Amer >60 >60 mL/min/1.73m2    Bilirubin, Total 0.7 0.0 - 1.0 mg/dL    Calcium 10.2 8.5 - 10.5 mg/dL    Protein, Total 8.0 6.0 - 8.0 g/dL    Albumin 4.4 3.5 - 5.0 g/dL    Alkaline Phosphatase 69 45 - 120 U/L    AST 31 0 - 40 U/L    ALT 36 0 - 45 U/L     Results for orders placed or performed in visit on 11/08/17   Basic Metabolic Panel   Result Value Ref Range    Sodium 136 136 - 145 mmol/L    Potassium 4.6 3.5 - 5.0 mmol/L    Chloride 99 98 - 107 mmol/L    CO2 27 22 - 31 mmol/L    Anion Gap, Calculation 10 5 - 18 mmol/L    Glucose 85 70 - 125 mg/dL    Calcium 10.1 8.5 - 10.5 mg/dL    BUN 14 8 - 22 mg/dL    Creatinine 0.99 0.70 - 1.30 mg/dL    GFR MDRD Af Amer >60 >60 mL/min/1.73m2    GFR MDRD Non Af Amer >60 >60 mL/min/1.73m2     Lab Results   Component Value Date    LIPASE 28 02/16/2010     Lab Results   Component Value Date    CHOL 182 10/04/2017    CHOL 185 07/20/2016    CHOL 206 (H) 10/06/2015     Lab Results   Component Value Date    HDL 40 10/04/2017    HDL 43 07/20/2016    HDL 45 10/06/2015     Lab Results   Component Value Date    LDLCALC 107 10/04/2017    LDLCALC 112 07/20/2016    LDLCALC 133 (H) 10/06/2015     Lab Results   Component Value Date    TRIG 176 (H) 10/04/2017    TRIG 148 07/20/2016    TRIG 140 10/06/2015     No components found for: CHOLHDL  Vitamin D, Total (25-Hydroxy)   Date Value Ref Range Status   07/20/2016 29.8 (L) 30.0 - 80.0 ng/mL Final

## 2021-06-21 NOTE — LETTER
Letter by Jeffy Georges MD at      Author: Jeffy Georges MD Service: -- Author Type: --    Filed:  Encounter Date: 1/18/2021 Status: (Other)         Elijah VANDANA Blood  [address]   [place] [address]             January 18, 2021         Dear Mr. Blood,    Below are the results from your recent visit:    Resulted Orders   Comprehensive Metabolic Panel   Result Value Ref Range    Sodium 138 136 - 145 mmol/L    Potassium 4.1 3.5 - 5.0 mmol/L    Chloride 102 98 - 107 mmol/L    CO2 26 22 - 31 mmol/L    Anion Gap, Calculation 10 5 - 18 mmol/L    Glucose 89 70 - 125 mg/dL    BUN 12 8 - 22 mg/dL    Creatinine 1.03 0.70 - 1.30 mg/dL    GFR MDRD Af Amer >60 >60 mL/min/1.73m2    GFR MDRD Non Af Amer >60 >60 mL/min/1.73m2    Bilirubin, Total 0.7 0.0 - 1.0 mg/dL    Calcium 9.5 8.5 - 10.5 mg/dL    Protein, Total 7.6 6.0 - 8.0 g/dL    Albumin 4.5 3.5 - 5.0 g/dL    Alkaline Phosphatase 72 45 - 120 U/L    AST 34 0 - 40 U/L    ALT 38 0 - 45 U/L    Narrative    Fasting Glucose reference range is 70-99 mg/dL per  American Diabetes Association (ADA) guidelines.   Lipid Profile   Result Value Ref Range    Triglycerides 110 <=149 mg/dL    Cholesterol 155 <=199 mg/dL    LDL Calculated 93 <=129 mg/dL    HDL Cholesterol 40 >=40 mg/dL    Patient Fasting > 8hrs? Yes    Magnesium   Result Value Ref Range    Magnesium 2.1 1.8 - 2.6 mg/dL   Thyroid Cascade   Result Value Ref Range    TSH 2.10 0.30 - 5.00 uIU/mL       Luis Humphreyshan:  Your cholesterol panel is good.  The magnesium level is normal so you may continue any supplement as you are presently doing.  The thyroid function is normal as are the remaining labs.  If you are having any more concern with palpitations let me know and then we will schedule a Cardiac Event Monitor.   It was a pleasure seeing you in clinic today.       Please call with questions or contact us using [facility].    Sincerely,        Electronically signed by Jeffy Georges MD

## 2021-06-23 NOTE — TELEPHONE ENCOUNTER
Please verify pt got this message from 1-26-19.  Never was read on [employer] Elijah:   Lets switch you to simvastatin 10 mg daily. (stop the pravastatin)   I will call the new rx.   Recheck fasting labs via a lab appt in 8 weeks (to check lipoids and liver function).   Dr Georges

## 2021-06-23 NOTE — TELEPHONE ENCOUNTER
Medication Question or Clarification  Who is calling: Pharmacy: [facility]  What medication are you calling about?: Pravastatin 10mg tablet  What dose do you take?: 1 tablet   How often are you taking the medication?: daily  Who prescribed the medication?: Jeffy Georges MD  What is your question/concern?: Fax received from pharmacy stating that the patient is requesting a cheaper alternative to the above medication. Patient pays $26 copay per script of Pravastatin.   Simvastatin 5mg would be $4 per fill  Lovastatin 10mg would be $4 per fill  Pharmacy: [facility]  Okay to leave a detailed message?: No-speak to pharmacy staff  Site CMT - Please call the pharmacy to obtain any additional needed information.

## 2021-06-23 NOTE — TELEPHONE ENCOUNTER
High Nava:  Lets switch you to simvastatin 10 mg daily.  I will call the new rx.  Recheck fasting labs via a lab appt in 8 weeks (to check lipoids and liver function).  Dr Georges

## 2021-06-23 NOTE — TELEPHONE ENCOUNTER
Refill Approved    Rx renewed per Medication Renewal Policy. Medication was last renewed on 10/23/18.    Cirilo Nicole, [facility] Triage/Med Refill 1/25/2019     Requested Prescriptions   Pending Prescriptions Disp Refills     pravastatin (PRAVACHOL) 10 MG tablet [Pharmacy Med Name: PRAVASTATIN SODIUM 10 MG TAB] 90 tablet 0     Sig: TAKE 1 TABLET BY MOUTH EVERY DAY    Statins Refill Protocol (Hmg CoA Reductase Inhibitors) Passed - 1/24/2019  2:10 AM       Passed - PCP or prescribing provider visit in past 12 months     Last office visit with prescriber/PCP: 11/7/2018 Jeffy Georges MD OR same dept: 11/7/2018 Jeffy Georges MD OR same specialty: 11/7/2018 Jeffy Georges MD  Last physical: 10/4/2017 Last MTM visit: Visit date not found   Next visit within 3 mo: Visit date not found  Next physical within 3 mo: Visit date not found  Prescriber OR PCP: Jeffy Georges MD  Last diagnosis associated with med order: 1. Hyperlipemia  - pravastatin (PRAVACHOL) 10 MG tablet [Pharmacy Med Name: PRAVASTATIN SODIUM 10 MG TAB]; TAKE 1 TABLET BY MOUTH EVERY DAY  Dispense: 90 tablet; Refill: 0    If protocol passes may refill for 12 months if within 3 months of last provider visit (or a total of 15 months).

## 2021-07-16 ENCOUNTER — VIRTUAL VISIT (OUTPATIENT)
Dept: URGENT CARE | Facility: CLINIC | Age: 41
End: 2021-07-16
Payer: COMMERCIAL

## 2021-07-16 DIAGNOSIS — J01.90 ACUTE NON-RECURRENT SINUSITIS, UNSPECIFIED LOCATION: Primary | ICD-10-CM

## 2021-07-16 PROCEDURE — 99213 OFFICE O/P EST LOW 20 MIN: CPT | Mod: TEL

## 2021-07-16 RX ORDER — PREDNISONE 10 MG/1
10 TABLET ORAL 2 TIMES DAILY
Qty: 8 TABLET | Refills: 0 | Status: SHIPPED | OUTPATIENT
Start: 2021-07-16 | End: 2021-07-20

## 2021-07-16 NOTE — PROGRESS NOTES
Elijah is a 40 year old who is being evaluated via a billable telephone visit.          Assessment & Plan     Acute non-recurrent sinusitis, unspecified location    - amoxicillin-clavulanate (AUGMENTIN) 875-125 MG tablet; Take 1 tablet by mouth 2 times daily for 10 days  - predniSONE (DELTASONE) 10 MG tablet; Take 1 tablet (10 mg) by mouth 2 times daily for 4 days      15 minutes spent on the date of the encounter doing chart review, patient visit and documentation         See Patient Instructions    Return in about 1 week (around 7/23/2021).    Virtual Urgent Care  [facility] VIRTUAL URGENT CARE    Subjective   Elijah is a 40 year old who presents for the following health issues     HPI     40-year-old male with sinus pain and pressure, nasal congestion, sore throat, sinus headache for almost 3 weeks.  States his symptoms started as a cold.  Has had Covid vaccine.    Review of Systems   Constitutional, HEENT, cardiovascular, pulmonary, gi and gu systems are negative, except as otherwise noted.      Objective           Vitals:  No vitals were obtained today due to virtual visit.    Physical Exam   healthy, alert and no distress  PSYCH: Alert and oriented times 3; coherent speech, normal   rate and volume, able to articulate logical thoughts, able   to abstract reason, no tangential thoughts, no hallucinations   or delusions  His affect is normal  RESP: No cough, no audible wheezing, able to talk in full sentences  Remainder of exam unable to be completed due to telephone visits            Phone call duration: 8 minutes

## 2021-07-16 NOTE — PATIENT INSTRUCTIONS
Patient Education     Sinusitis (Antibiotic Treatment)    The sinuses are air-filled spaces within the bones of the face. They connect to the inside of the nose. Sinusitis is an inflammation of the tissue that lines the sinuses. Sinusitis can occur during a cold. It can also happen due to allergies to pollens and other particles in the air. Sinusitis can cause symptoms of sinus congestion and a feeling of fullness. A sinus infection causes fever, headache, and facial pain. There is often green or yellow fluid draining from the nose or into the back of the throat (post-nasal drip). You have been given antibiotics to treat this condition.   Home care    Take the full course of antibiotics as instructed. Don't stop taking them, even when you feel better.    Drink plenty of water, hot tea, and other liquids as directed by the healthcare provider. This may help thin nasal mucus. It also may help your sinuses drain fluids.    Heat may help soothe painful areas of your face. Use a towel soaked in hot water. Or,  the shower and direct the warm spray onto your face. Using a vaporizer along with a menthol rub at night may also help soothe symptoms.     An expectorant with guaifenesin may help thin nasal mucus and help your sinuses drain fluids. Talk with your provider or pharmacists before taking an over-the-counter (OTC) medicine if you have any questions about it or its side effects..    You can use an OTC decongestant, unless a similar medicine was prescribed to you. Nasal sprays work the fastest. Use one that contains phenylephrine or oxymetazoline. First blow your nose gently. Then use the spray. Don't use these medicines more often than directed on the label. If you do, your symptoms may get worse. You may also take pills that contain pseudoephedrine. Don t use products that combine multiple medicines. This is because side effects may be increased. Read labels. You can also ask the pharmacist for help. (People  with high blood pressure should not use decongestants. They can raise blood pressure.) Talk with your provider or pharmacist if you have any questions about the medicine..    OTC antihistamines may help if allergies contributed to your sinusitis. Talk with your provider or pharmacist if you have any questions about the medicine..    Don't use nasal rinses or irrigation during an acute sinus infection, unless your healthcare provider tells you to. Rinsing may spread the infection to other areas in your sinuses.    Use acetaminophen or ibuprofen to control pain, unless another pain medicine was prescribed to you. If you have chronic liver or kidney disease or ever had a stomach ulcer, talk with your healthcare provider before using these medicines. Never give aspirin to anyone under age 18 who is ill with a fever. It may cause severe liver damage.    Don't smoke. This can make symptoms worse.    Follow-up care  Follow up with your healthcare provider, or as advised.   When to seek medical advice  Call your healthcare provider if any of these occur:     Facial pain or headache that gets worse    Stiff neck    Unusual drowsiness or confusion    Swelling of your forehead or eyelids    Symptoms don't go away in 10 days    Vision problems, such as blurred or double vision    Fever of 100.4 F (38 C) or higher, or as directed by your healthcare provider  Call 911  Call 911 if any of these occur:     Seizure    Trouble breathing    Feeling dizzy or faint    Fingernails, skin or lips look blue, purple , or gray  Prevention  Here are steps you can take to help prevent an infection:     Keep good hand washing habits.    Don t have close contact with people who have sore throats, colds, or other upper respiratory infections.    Don t smoke, and stay away from secondhand smoke.    Stay up to date with of your vaccines.  [employer] last reviewed this educational content on 12/1/2019 2000-2021 [employer]. All rights  reserved. This information is not intended as a substitute for professional medical care. Always follow your healthcare professional's instructions.

## 2021-08-30 ENCOUNTER — OFFICE VISIT (OUTPATIENT)
Dept: FAMILY MEDICINE | Facility: CLINIC | Age: 41
End: 2021-08-30
Payer: COMMERCIAL

## 2021-08-30 VITALS
SYSTOLIC BLOOD PRESSURE: 134 MMHG | WEIGHT: 214.06 LBS | OXYGEN SATURATION: 98 % | RESPIRATION RATE: 16 BRPM | HEART RATE: 58 BPM | DIASTOLIC BLOOD PRESSURE: 83 MMHG | BODY MASS INDEX: 30.71 KG/M2

## 2021-08-30 DIAGNOSIS — N48.1 BALANITIS: Primary | ICD-10-CM

## 2021-08-30 PROCEDURE — 99213 OFFICE O/P EST LOW 20 MIN: CPT | Performed by: NURSE PRACTITIONER

## 2021-08-30 RX ORDER — CLOTRIMAZOLE 1 %
CREAM (GRAM) TOPICAL 3 TIMES DAILY
Qty: 24 G | Refills: 0 | Status: SHIPPED | OUTPATIENT
Start: 2021-08-30 | End: 2021-09-13

## 2021-08-30 NOTE — PROGRESS NOTES
"Assessment & Plan     Balanitis  Area of mild erythema with some skin breakdown without satellite lesions along lower edge of head of penis.    - clotrimazole (LOTRIMIN) 1 % external cream  Dispense: 24 g; Refill: 0 use 2-3 times daily for 14 days.  If not much better by this time, should be rechecked, sooner if worsening.      Discussed also sitz bath, avoidance of sexual intercourse, avoidance of hydrocortisone.          No follow-ups on file.    Reshma Herrera, CNP  [facility]    Maurice Nava is a 40 year old male who presents to clinic today for the following health issues:  Chief Complaint   Patient presents with     Derm Problem     x2wks, around the tip of the penis, sore and achy     HPI    2-3 weeks rash just below head of penis.      Not circumcised; has had issues off and on \"since I was a kid.\"     Used hydrocortisone with seems to calm it down a bit.      Gets a little itchy at times.  Now is painful. Normally not painful.  Erections were painful recently.      Able to retract foreskin.  No issues urinating.        Review of Systems  See HPI.  Denies other areas of rash.         Objective    /83   Pulse 58   Resp 16   Wt 97.1 kg (214 lb 1 oz)   SpO2 98%   BMI 30.71 kg/m    Physical Exam  Genitourinary:     Penis: Uncircumcised. No swelling.       Testes: Normal.      Comments: Area of erythema with mild skin breakdown along lower edge of head of penis.  Able to retract foreskin.                       "

## 2021-08-30 NOTE — PATIENT INSTRUCTIONS
Patient Education     Balanitis     Balanitis is an inflammation of the head of the penis. It can happen if there is a buildup of germs under the foreskin. These germs could be bacteria, viruses, or fungi. It can also occur from exposure to soaps and other chemicals. In adults, this is most often a complication of diabetes. It can also be due to obesity or poor genital cleaning habits.  If not treated right away, this can lead to a condition called phimosis. This means you can't pull back the foreskin from the head of the penis.  Symptoms of balanitis may include:    Penis pain or soreness    Discharge    Inability to retract the foreskin    Trouble urinating    Inability to get an erection (erectile dysfunction or impotence)  Home care  The following guidelines will help you care for your condition at home:    If you can retract your foreskin:  ? Children. Retract the foreskin and clean with water. Put antibiotic cream or ointment on the penis 3 times a day.  ? Adults. Retract the foreskin and clean with water. Apply clotrimazole cream to the penis 3 times a day unless another medicine was prescribed. You can buy this cream over the counter. Don't have sex while being treated.  ? Sitz baths. Soak the penis and foreskin in warm water while there is inflammation.    If you have diabetes, talk with your healthcare provider about keeping your diabetes in good control.    If you are overweight, talk with your provider about a weight-loss plan.  Follow-up care  Follow up with your healthcare provider, or as advised.  When to get medical advice  Call your healthcare provider right away if any of these occur:    You can't return the retracted foreskin to the forward position (get medical care right away)    You can't retract the foreskin    Your symptoms get worse    Urine flow is partly or fully blocked  [employer] last reviewed this educational content on 10/1/2019    8803-2639 [employer]. All rights  reserved. This information is not intended as a substitute for professional medical care. Always follow your healthcare professional's instructions.

## 2021-10-08 DIAGNOSIS — E78.5 HYPERLIPIDEMIA, UNSPECIFIED HYPERLIPIDEMIA TYPE: ICD-10-CM

## 2021-10-09 RX ORDER — SIMVASTATIN 10 MG
TABLET ORAL
Qty: 90 TABLET | Refills: 0 | Status: SHIPPED | OUTPATIENT
Start: 2021-10-09 | End: 2022-01-10

## 2021-10-09 NOTE — TELEPHONE ENCOUNTER
"Last Written Prescription Date:  1/18/21  Last Fill Quantity: 90,  # refills: 1   Last office visit provider:  1/18/21     Requested Prescriptions   Pending Prescriptions Disp Refills     simvastatin (ZOCOR) 10 MG tablet [Pharmacy Med Name: SIMVASTATIN 10 MG TABLET] 90 tablet 1     Sig: TAKE 1 TABLET BY MOUTH EVERY DAY IN THE EVENING       Statins Protocol Passed - 10/8/2021 12:31 AM        Passed - LDL on file in past 12 months     Recent Labs   Lab Test 01/18/21  0751   LDL 93             Passed - No abnormal creatine kinase in past 12 months     No lab results found.             Passed - Recent (12 mo) or future (30 days) visit within the authorizing provider's specialty     Patient has had an office visit with the authorizing provider or a provider within the authorizing providers department within the previous 12 mos or has a future within next 30 days. See \"Patient Info\" tab in inbasket, or \"Choose Columns\" in Meds & Orders section of the refill encounter.              Passed - Medication is active on med list        Passed - Patient is age 18 or older             aydin zazueta RN 10/09/21 3:09 PM  "

## 2021-10-11 ENCOUNTER — HEALTH MAINTENANCE LETTER (OUTPATIENT)
Age: 41
End: 2021-10-11

## 2021-12-02 DIAGNOSIS — N52.9 ERECTILE DYSFUNCTION, UNSPECIFIED ERECTILE DYSFUNCTION TYPE: ICD-10-CM

## 2021-12-04 RX ORDER — SILDENAFIL CITRATE 20 MG/1
TABLET ORAL
Qty: 30 TABLET | Refills: 5 | Status: SHIPPED | OUTPATIENT
Start: 2021-12-04 | End: 2023-03-09

## 2021-12-04 NOTE — TELEPHONE ENCOUNTER
"Last Written Prescription Date:  09/23/20  Last Fill Quantity: 30,  # refills: 5   Last office visit provider:  8/30/21     Requested Prescriptions   Pending Prescriptions Disp Refills     sildenafil (REVATIO) 20 MG tablet [Pharmacy Med Name: SILDENAFIL 20 MG TABLET] 30 tablet 5     Sig: TAKE ONE TO THREE TABS ORALLY 30 MIN TO FOUR HOURS PRIOR TO INTERCOURSE       Erectile Dysfuction Protocol Passed - 12/2/2021  9:04 AM        Passed - Absence of nitrates on medication list        Passed - Absence of Alpha Blockers on Med list        Passed - Recent (12 mo) or future (30 days) visit within the authorizing provider's specialty     Patient has had an office visit with the authorizing provider or a provider within the authorizing providers department within the previous 12 mos or has a future within next 30 days. See \"Patient Info\" tab in inbasket, or \"Choose Columns\" in Meds & Orders section of the refill encounter.              Passed - Medication is active on med list        Passed - Patient is age 18 or older             Aramis Rowe RN 12/04/21 1:04 PM  "

## 2022-01-28 DIAGNOSIS — K21.9 GERD (GASTROESOPHAGEAL REFLUX DISEASE): ICD-10-CM

## 2022-01-28 RX ORDER — PANTOPRAZOLE SODIUM 40 MG/1
TABLET, DELAYED RELEASE ORAL
Qty: 90 TABLET | Refills: 3 | Status: SHIPPED | OUTPATIENT
Start: 2022-01-28 | End: 2023-02-02

## 2022-01-28 RX ORDER — PANTOPRAZOLE SODIUM 40 MG/1
TABLET, DELAYED RELEASE ORAL
Qty: 90 TABLET | Refills: 3 | Status: CANCELLED | OUTPATIENT
Start: 2022-01-28

## 2022-01-28 NOTE — TELEPHONE ENCOUNTER
Incoming request from [facility]. Looks like pt has already been contacted to schedule an appointment

## 2022-01-28 NOTE — TELEPHONE ENCOUNTER
Patient wants pantoprazole pills called in for [facility]. He is completley out

## 2022-03-08 ENCOUNTER — OFFICE VISIT (OUTPATIENT)
Dept: FAMILY MEDICINE | Facility: CLINIC | Age: 42
End: 2022-03-08
Payer: COMMERCIAL

## 2022-03-08 VITALS
WEIGHT: 216.6 LBS | HEIGHT: 70 IN | DIASTOLIC BLOOD PRESSURE: 74 MMHG | HEART RATE: 56 BPM | SYSTOLIC BLOOD PRESSURE: 137 MMHG | RESPIRATION RATE: 16 BRPM | BODY MASS INDEX: 31.01 KG/M2

## 2022-03-08 DIAGNOSIS — Z12.5 SCREENING PSA (PROSTATE SPECIFIC ANTIGEN): ICD-10-CM

## 2022-03-08 DIAGNOSIS — E78.5 HYPERLIPIDEMIA, UNSPECIFIED HYPERLIPIDEMIA TYPE: ICD-10-CM

## 2022-03-08 DIAGNOSIS — R00.2 PALPITATIONS: ICD-10-CM

## 2022-03-08 DIAGNOSIS — I10 PRIMARY HYPERTENSION: ICD-10-CM

## 2022-03-08 DIAGNOSIS — Z00.00 HEALTHCARE MAINTENANCE: ICD-10-CM

## 2022-03-08 LAB
ALBUMIN SERPL-MCNC: 4.5 G/DL (ref 3.5–5)
ALP SERPL-CCNC: 67 U/L (ref 45–120)
ALT SERPL W P-5'-P-CCNC: 39 U/L (ref 0–45)
ANION GAP SERPL CALCULATED.3IONS-SCNC: 11 MMOL/L (ref 5–18)
AST SERPL W P-5'-P-CCNC: 32 U/L (ref 0–40)
BILIRUB SERPL-MCNC: 0.5 MG/DL (ref 0–1)
BUN SERPL-MCNC: 16 MG/DL (ref 8–22)
CALCIUM SERPL-MCNC: 10.1 MG/DL (ref 8.5–10.5)
CHLORIDE BLD-SCNC: 100 MMOL/L (ref 98–107)
CHOLEST SERPL-MCNC: 171 MG/DL
CO2 SERPL-SCNC: 27 MMOL/L (ref 22–31)
CREAT SERPL-MCNC: 0.91 MG/DL (ref 0.7–1.3)
ERYTHROCYTE [DISTWIDTH] IN BLOOD BY AUTOMATED COUNT: 12.6 % (ref 10–15)
FASTING STATUS PATIENT QL REPORTED: YES
GFR SERPL CREATININE-BSD FRML MDRD: >90 ML/MIN/1.73M2
GLUCOSE BLD-MCNC: 88 MG/DL (ref 70–125)
HCT VFR BLD AUTO: 44.9 % (ref 40–53)
HDLC SERPL-MCNC: 38 MG/DL
HGB BLD-MCNC: 15.5 G/DL (ref 13.3–17.7)
LDLC SERPL CALC-MCNC: 94 MG/DL
MAGNESIUM SERPL-MCNC: 2.1 MG/DL (ref 1.8–2.6)
MCH RBC QN AUTO: 28 PG (ref 26.5–33)
MCHC RBC AUTO-ENTMCNC: 34.5 G/DL (ref 31.5–36.5)
MCV RBC AUTO: 81 FL (ref 78–100)
PLATELET # BLD AUTO: 262 10E3/UL (ref 150–450)
POTASSIUM BLD-SCNC: 4.4 MMOL/L (ref 3.5–5)
PROT SERPL-MCNC: 8.1 G/DL (ref 6–8)
PSA SERPL-MCNC: 0.77 UG/L (ref 0–2.5)
RBC # BLD AUTO: 5.53 10E6/UL (ref 4.4–5.9)
SODIUM SERPL-SCNC: 138 MMOL/L (ref 136–145)
TRIGL SERPL-MCNC: 196 MG/DL
WBC # BLD AUTO: 8.6 10E3/UL (ref 4–11)

## 2022-03-08 PROCEDURE — G0103 PSA SCREENING: HCPCS | Performed by: FAMILY MEDICINE

## 2022-03-08 PROCEDURE — 80061 LIPID PANEL: CPT | Performed by: FAMILY MEDICINE

## 2022-03-08 PROCEDURE — 36415 COLL VENOUS BLD VENIPUNCTURE: CPT | Performed by: FAMILY MEDICINE

## 2022-03-08 PROCEDURE — 99214 OFFICE O/P EST MOD 30 MIN: CPT | Mod: 25 | Performed by: FAMILY MEDICINE

## 2022-03-08 PROCEDURE — 80053 COMPREHEN METABOLIC PANEL: CPT | Performed by: FAMILY MEDICINE

## 2022-03-08 PROCEDURE — 85027 COMPLETE CBC AUTOMATED: CPT | Performed by: FAMILY MEDICINE

## 2022-03-08 PROCEDURE — 82306 VITAMIN D 25 HYDROXY: CPT | Performed by: FAMILY MEDICINE

## 2022-03-08 PROCEDURE — 83735 ASSAY OF MAGNESIUM: CPT | Performed by: FAMILY MEDICINE

## 2022-03-08 PROCEDURE — 99396 PREV VISIT EST AGE 40-64: CPT | Performed by: FAMILY MEDICINE

## 2022-03-08 RX ORDER — METOPROLOL SUCCINATE 50 MG/1
50 TABLET, EXTENDED RELEASE ORAL DAILY
Qty: 90 TABLET | Refills: 3 | Status: SHIPPED | OUTPATIENT
Start: 2022-03-08 | End: 2023-03-09

## 2022-03-08 RX ORDER — LISINOPRIL 20 MG/1
TABLET ORAL
Qty: 90 TABLET | Refills: 3 | Status: SHIPPED | OUTPATIENT
Start: 2022-03-08 | End: 2023-03-09

## 2022-03-08 RX ORDER — SIMVASTATIN 10 MG
TABLET ORAL
Qty: 90 TABLET | Refills: 3 | Status: SHIPPED | OUTPATIENT
Start: 2022-03-08 | End: 2023-03-09

## 2022-03-08 RX ORDER — MAGNESIUM 30 MG
30 TABLET ORAL DAILY
Start: 2022-03-08

## 2022-03-08 NOTE — LETTER
March 9, 2022      Elijah Blood  [address]  [place] [address]        Dear [facility],  We are writing to inform you of your test results.    Luis Mojica:  Your cholesterol panel looks good other than for a slightly elevated triglyceride level and slightly low HDL level.    Your baseline PSA is normal and should be rechecked in one year.  The remaining labs are NORMAL.    Resulted Orders   Lipid panel   Result Value Ref Range    Cholesterol 171 <=199 mg/dL    Triglycerides 196 (H) <=149 mg/dL    Direct Measure HDL 38 (L) >=40 mg/dL      Comment:      HDL Cholesterol Reference Range:     0-2 years:   No reference ranges established for patients under 2 years old  at [facility] for lipid analytes.    2-8 years:  Greater than 45 mg/dL     18 years and older:   Female: Greater than or equal to 50 mg/dL   Male:   Greater than or equal to 40 mg/dL    LDL Cholesterol Calculated 94 <=129 mg/dL    Patient Fasting > 8hrs? Yes    Comprehensive metabolic panel (BMP + Alb, Alk Phos, ALT, AST, Total. Bili, TP)   Result Value Ref Range    Sodium 138 136 - 145 mmol/L    Potassium 4.4 3.5 - 5.0 mmol/L    Chloride 100 98 - 107 mmol/L    Carbon Dioxide (CO2) 27 22 - 31 mmol/L    Anion Gap 11 5 - 18 mmol/L    Urea Nitrogen 16 8 - 22 mg/dL    Creatinine 0.91 0.70 - 1.30 mg/dL    Calcium 10.1 8.5 - 10.5 mg/dL    Glucose 88 70 - 125 mg/dL    Alkaline Phosphatase 67 45 - 120 U/L    AST 32 0 - 40 U/L    ALT 39 0 - 45 U/L    Protein Total 8.1 (H) 6.0 - 8.0 g/dL    Albumin 4.5 3.5 - 5.0 g/dL    Bilirubin Total 0.5 0.0 - 1.0 mg/dL    GFR Estimate >90 >60 mL/min/1.73m2      Comment:      Effective December 21, 2021 eGFRcr in adults is calculated using the 2021 CKD-EPI creatinine equation which includes age and gender (Valentina jeffers al., NEJM, DOI: 10.1056/MXSXcx3356616)   Magnesium   Result Value Ref Range    Magnesium 2.1 1.8 - 2.6 mg/dL   CBC with platelets   Result Value Ref Range    WBC Count 8.6 4.0 - 11.0 10e3/uL     RBC Count 5.53 4.40 - 5.90 10e6/uL    Hemoglobin 15.5 13.3 - 17.7 g/dL    Hematocrit 44.9 40.0 - 53.0 %    MCV 81 78 - 100 fL    MCH 28.0 26.5 - 33.0 pg    MCHC 34.5 31.5 - 36.5 g/dL    RDW 12.6 10.0 - 15.0 %    Platelet Count 262 150 - 450 10e3/uL   Vitamin D Deficiency   Result Value Ref Range    Vitamin D, Total (25-Hydroxy) 39 30 - 80 ug/L    Narrative    Deficiency <10.0 ug/L  Insufficiency 10.0-29.9 ug/L  Sufficiency 30.0-80.0 ug/L  Toxicity (possible) >100.0 ug/L    PSA, screen   Result Value Ref Range    Prostate Specific Antigen Screen 0.77 0.00 - 2.50 ug/L    Narrative    Assay Method is Abbott Prostate-Specific Antigen (PSA)  Standard-WHO 1st International (90:10)       If you have any questions or concerns, please call the clinic at the number listed above.       Sincerely,      Jeffy Georges MD

## 2022-03-08 NOTE — PATIENT INSTRUCTIONS
Fasting labs    Start PSA screening due to unknown family hx on his fathers side.    If you want a Covid booster just call and we will set that up

## 2022-03-08 NOTE — PROGRESS NOTES
SUBJECTIVE:   CC: Elijah Blood is an 41 year old male who presents for preventative health visit.       Patient has been advised of split billing requirements and indicates understanding: Yes  Healthy Habits:     Getting at least 3 servings of Calcium per day:  Yes    Bi-annual eye exam:  Yes    Dental care twice a year:  Yes    Sleep apnea or symptoms of sleep apnea:  None    Diet:  Regular (no restrictions)    Frequency of exercise:  4-5 days/week    Duration of exercise:  Greater than 60 minutes    Taking medications regularly:  Yes    Medication side effects:  Other    PHQ-2 Total Score: 0    Additional concerns today:  No      Today's PHQ-2 Score:   PHQ-2 ( 1999 Pfizer) 3/8/2022   Q1: Little interest or pleasure in doing things 0   Q2: Feeling down, depressed or hopeless 0   PHQ-2 Score 0   Q1: Little interest or pleasure in doing things Not at all   Q2: Feeling down, depressed or hopeless Not at all   PHQ-2 Score 0       Abuse: Current or Past(Physical, Sexual or Emotional)- No  Do you feel safe in your environment? Yes    Real father unknown to pt.    Social History     Tobacco Use     Smoking status: Never Smoker     Smokeless tobacco: Never Used   Substance Use Topics     Alcohol use: Yes     Comment: Alcoholic Drinks/day: occasional use     NON SMOKER  NO ALCOHOL    If you drink alcohol do you typically have >3 drinks per day or >7 drinks per week? No        Last PSA: No results found for: PSA      Reviewed and updated as needed this visit by clinical staff   Tobacco  Allergies  Meds              Reviewed and updated as needed this visit by Provider                 Review of Systems  CONSTITUTIONAL: NEGATIVE for fever, chills, change in weight  INTEGUMENTARY/SKIN: NEGATIVE for worrisome rashes, moles or lesions  EYES: NEGATIVE for vision changes or irritation  ENT: NEGATIVE for ear, mouth and throat problems  RESP: NEGATIVE for significant cough or SOB  CV: NEGATIVE for chest pain, palpitations or  "peripheral edema  GI: NEGATIVE for nausea, abdominal pain, heartburn, or change in bowel habits   male: negative for dysuria, hematuria, decreased urinary stream, erectile dysfunction, urethral discharge  MUSCULOSKELETAL: NEGATIVE for significant arthralgias or myalgia  NEURO: NEGATIVE for weakness, dizziness or paresthesias  PSYCHIATRIC: NEGATIVE for changes in mood or affect    No results found for: PSA      OBJECTIVE:   /74 (BP Location: Left arm, Patient Position: Sitting, Cuff Size: Adult Large)   Pulse 56   Resp 16   Ht 1.778 m (5' 10\")   Wt 98.2 kg (216 lb 9.6 oz)   BMI 31.08 kg/m    Wt Readings from Last 4 Encounters:   03/08/22 98.2 kg (216 lb 9.6 oz)   08/30/21 97.1 kg (214 lb 1 oz)   01/18/21 97.2 kg (214 lb 4 oz)   01/06/20 93.8 kg (206 lb 12.8 oz)     Physical Exam  GENERAL: healthy, alert and no distress  EYES: Eyes grossly normal to inspection, PERRL and conjunctivae and sclerae normal  HENT: ear canals and TM's normal, nose and mouth without ulcers or lesions  NECK: no adenopathy, no asymmetry, masses, or scars and thyroid normal to palpation  RESP: lungs clear to auscultation - no rales, rhonchi or wheezes  CV: regular rate and rhythm, normal S1 S2, no S3 or S4, no murmur, click or rub, no peripheral edema and peripheral pulses strong  ABDOMEN: soft, nontender, no hepatosplenomegaly, no masses and bowel sounds normal  RECTAL:  Prostate is smooth, symmetric without nodularity, mass, firmness or asymmetry  MS: no gross musculoskeletal defects noted, no edema  SKIN: no suspicious lesions or rashes  NEURO: Normal strength and tone, mentation intact and speech normal  PSYCH: mentation appears normal, affect normal/bright      ASSESSMENT/PLAN:       ICD-10-CM    1. Healthcare maintenance  Z00.00 CBC with platelets     Vitamin D Deficiency   2. Primary hypertension, STABLE ON PRESENT MEDS I10 lisinopril (ZESTRIL) 20 MG tablet     metoprolol succinate ER (TOPROL-XL) 50 MG 24 hr tablet     " "Comprehensive metabolic panel (BMP + Alb, Alk Phos, ALT, AST, Total. Bili, TP)   3. Hyperlipidemia, unspecified hyperlipidemia type , STABLE ON STATIN E78.5 simvastatin (ZOCOR) 10 MG tablet     Lipid panel   4. Palpitations, RESOLVED ON MAGNESIUM R00.2 magnesium 30 MG tablet     Magnesium   5. Screening PSA (prostate specific antigen)  Z12.5 PSA, screen       PLAN:   Fasting labs    Start PSA screening due to unknown family hx on his fathers side.    If you want a Covid booster just call and we will set that up         Patient has been advised of split billing requirements and indicates understanding: Yes    COUNSELING:   Reviewed preventive health counseling, as reflected in patient instructions       Regular exercise       Healthy diet/nutrition    Estimated body mass index is 31.08 kg/m  as calculated from the following:    Height as of this encounter: 1.778 m (5' 10\").    Weight as of this encounter: 98.2 kg (216 lb 9.6 oz).     Weight management plan: Discussed healthy diet and exercise guidelines    He reports that he has never smoked. He has never used smokeless tobacco.        Jeffy Georges MD  [facility]  "

## 2022-03-09 LAB — DEPRECATED CALCIDIOL+CALCIFEROL SERPL-MC: 39 UG/L (ref 30–80)

## 2022-09-25 ENCOUNTER — HEALTH MAINTENANCE LETTER (OUTPATIENT)
Age: 42
End: 2022-09-25

## 2023-02-02 DIAGNOSIS — K21.9 GERD (GASTROESOPHAGEAL REFLUX DISEASE): ICD-10-CM

## 2023-02-02 RX ORDER — PANTOPRAZOLE SODIUM 40 MG/1
TABLET, DELAYED RELEASE ORAL
Qty: 90 TABLET | Refills: 3 | Status: SHIPPED | OUTPATIENT
Start: 2023-02-02 | End: 2024-03-12

## 2023-03-09 ENCOUNTER — OFFICE VISIT (OUTPATIENT)
Dept: FAMILY MEDICINE | Facility: CLINIC | Age: 43
End: 2023-03-09
Payer: COMMERCIAL

## 2023-03-09 VITALS
WEIGHT: 225 LBS | HEART RATE: 63 BPM | TEMPERATURE: 98.2 F | BODY MASS INDEX: 32.21 KG/M2 | HEIGHT: 70 IN | RESPIRATION RATE: 20 BRPM | SYSTOLIC BLOOD PRESSURE: 126 MMHG | OXYGEN SATURATION: 97 % | DIASTOLIC BLOOD PRESSURE: 84 MMHG

## 2023-03-09 DIAGNOSIS — Z00.00 HEALTHCARE MAINTENANCE: ICD-10-CM

## 2023-03-09 DIAGNOSIS — E78.5 HYPERLIPIDEMIA, UNSPECIFIED HYPERLIPIDEMIA TYPE: ICD-10-CM

## 2023-03-09 DIAGNOSIS — Z12.5 SCREENING PSA (PROSTATE SPECIFIC ANTIGEN): ICD-10-CM

## 2023-03-09 DIAGNOSIS — R00.2 PALPITATIONS: ICD-10-CM

## 2023-03-09 DIAGNOSIS — I10 PRIMARY HYPERTENSION: ICD-10-CM

## 2023-03-09 DIAGNOSIS — R10.11 RUQ ABDOMINAL PAIN: ICD-10-CM

## 2023-03-09 DIAGNOSIS — N52.9 ERECTILE DYSFUNCTION, UNSPECIFIED ERECTILE DYSFUNCTION TYPE: ICD-10-CM

## 2023-03-09 DIAGNOSIS — D72.829 LEUKOCYTOSIS, UNSPECIFIED TYPE: Primary | ICD-10-CM

## 2023-03-09 DIAGNOSIS — E66.811 CLASS 1 OBESITY DUE TO EXCESS CALORIES WITHOUT SERIOUS COMORBIDITY WITH BODY MASS INDEX (BMI) OF 32.0 TO 32.9 IN ADULT: ICD-10-CM

## 2023-03-09 DIAGNOSIS — E66.09 CLASS 1 OBESITY DUE TO EXCESS CALORIES WITHOUT SERIOUS COMORBIDITY WITH BODY MASS INDEX (BMI) OF 32.0 TO 32.9 IN ADULT: ICD-10-CM

## 2023-03-09 LAB
ALBUMIN SERPL BCG-MCNC: 4.9 G/DL (ref 3.5–5.2)
ALP SERPL-CCNC: 70 U/L (ref 40–129)
ALT SERPL W P-5'-P-CCNC: 40 U/L (ref 10–50)
ANION GAP SERPL CALCULATED.3IONS-SCNC: 15 MMOL/L (ref 7–15)
AST SERPL W P-5'-P-CCNC: 32 U/L (ref 10–50)
BILIRUB SERPL-MCNC: 0.5 MG/DL
BUN SERPL-MCNC: 13.9 MG/DL (ref 6–20)
CALCIUM SERPL-MCNC: 9.9 MG/DL (ref 8.6–10)
CHLORIDE SERPL-SCNC: 98 MMOL/L (ref 98–107)
CHOLEST SERPL-MCNC: 173 MG/DL
CREAT SERPL-MCNC: 1.1 MG/DL (ref 0.67–1.17)
DEPRECATED HCO3 PLAS-SCNC: 23 MMOL/L (ref 22–29)
ERYTHROCYTE [DISTWIDTH] IN BLOOD BY AUTOMATED COUNT: 12.4 % (ref 10–15)
GFR SERPL CREATININE-BSD FRML MDRD: 85 ML/MIN/1.73M2
GLUCOSE SERPL-MCNC: 84 MG/DL (ref 70–99)
HCT VFR BLD AUTO: 44.2 % (ref 40–53)
HDLC SERPL-MCNC: 41 MG/DL
HGB BLD-MCNC: 15.4 G/DL (ref 13.3–17.7)
LDLC SERPL CALC-MCNC: 106 MG/DL
MCH RBC QN AUTO: 28.2 PG (ref 26.5–33)
MCHC RBC AUTO-ENTMCNC: 34.8 G/DL (ref 31.5–36.5)
MCV RBC AUTO: 81 FL (ref 78–100)
NONHDLC SERPL-MCNC: 132 MG/DL
PLATELET # BLD AUTO: 287 10E3/UL (ref 150–450)
POTASSIUM SERPL-SCNC: 3.8 MMOL/L (ref 3.4–5.3)
PROT SERPL-MCNC: 8.2 G/DL (ref 6.4–8.3)
PSA SERPL-MCNC: 0.54 NG/ML (ref 0–2.5)
RBC # BLD AUTO: 5.46 10E6/UL (ref 4.4–5.9)
SODIUM SERPL-SCNC: 136 MMOL/L (ref 136–145)
TRIGL SERPL-MCNC: 128 MG/DL
WBC # BLD AUTO: 11.5 10E3/UL (ref 4–11)

## 2023-03-09 PROCEDURE — 36415 COLL VENOUS BLD VENIPUNCTURE: CPT | Performed by: FAMILY MEDICINE

## 2023-03-09 PROCEDURE — 99213 OFFICE O/P EST LOW 20 MIN: CPT | Mod: 25 | Performed by: FAMILY MEDICINE

## 2023-03-09 PROCEDURE — 80061 LIPID PANEL: CPT | Performed by: FAMILY MEDICINE

## 2023-03-09 PROCEDURE — 99396 PREV VISIT EST AGE 40-64: CPT | Performed by: FAMILY MEDICINE

## 2023-03-09 PROCEDURE — 82306 VITAMIN D 25 HYDROXY: CPT | Performed by: FAMILY MEDICINE

## 2023-03-09 PROCEDURE — G0103 PSA SCREENING: HCPCS | Performed by: FAMILY MEDICINE

## 2023-03-09 PROCEDURE — 80053 COMPREHEN METABOLIC PANEL: CPT | Performed by: FAMILY MEDICINE

## 2023-03-09 PROCEDURE — 85027 COMPLETE CBC AUTOMATED: CPT | Performed by: FAMILY MEDICINE

## 2023-03-09 RX ORDER — SIMVASTATIN 10 MG
TABLET ORAL
Qty: 90 TABLET | Refills: 3 | Status: SHIPPED | OUTPATIENT
Start: 2023-03-09 | End: 2024-03-12

## 2023-03-09 RX ORDER — SILDENAFIL CITRATE 20 MG/1
TABLET ORAL
Qty: 30 TABLET | Refills: 5 | Status: SHIPPED | OUTPATIENT
Start: 2023-03-09 | End: 2024-03-12

## 2023-03-09 RX ORDER — LISINOPRIL 20 MG/1
TABLET ORAL
Qty: 90 TABLET | Refills: 3 | Status: SHIPPED | OUTPATIENT
Start: 2023-03-09 | End: 2024-03-12

## 2023-03-09 RX ORDER — METOPROLOL SUCCINATE 50 MG/1
50 TABLET, EXTENDED RELEASE ORAL DAILY
Qty: 90 TABLET | Refills: 3 | Status: SHIPPED | OUTPATIENT
Start: 2023-03-09 | End: 2024-03-12

## 2023-03-09 ASSESSMENT — ENCOUNTER SYMPTOMS
EYE PAIN: 0
WEAKNESS: 0
PARESTHESIAS: 0
MYALGIAS: 0
HEMATURIA: 0
ARTHRALGIAS: 0
PALPITATIONS: 0
NAUSEA: 0
SORE THROAT: 0
CONSTIPATION: 0
HEARTBURN: 0
COUGH: 0
DIARRHEA: 0
JOINT SWELLING: 0
ABDOMINAL PAIN: 1
NERVOUS/ANXIOUS: 0
HEMATOCHEZIA: 0
FREQUENCY: 0
DIZZINESS: 0
HEADACHES: 0
CHILLS: 0
SHORTNESS OF BREATH: 0
DYSURIA: 0
FEVER: 0

## 2023-03-09 NOTE — PROGRESS NOTES
SUBJECTIVE:   CC: Elijah is an 42 year old who presents for preventative health visit.     Patient has been advised of split billing requirements and indicates understanding: Yes     Healthy Habits:     Getting at least 3 servings of Calcium per day:  Yes    Bi-annual eye exam:  Yes    Dental care twice a year:  Yes    Sleep apnea or symptoms of sleep apnea:  None    Diet:  Regular (no restrictions)    Frequency of exercise:  4-5 days/week    Duration of exercise:  45-60 minutes    Taking medications regularly:  Yes    Medication side effects:  None    PHQ-2 Total Score: 0    Additional concerns today:  No    Hyperlipidemia Follow-Up      Are you regularly taking any medication or supplement to lower your cholesterol?   Yes- Simvastatin 10 mg daily    Are you having muscle aches or other side effects that you think could be caused by your cholesterol lowering medication?  No    Hypertension Follow-up      Do you check your blood pressure regularly outside of the clinic? Yes     Are you following a low salt diet? Yes    Are your blood pressures ever more than 140 on the top number (systolic) OR more than 90 on the bottom number (diastolic), for example 140/90? No    Today's PHQ-2 Score:   PHQ-2 ( 1999 Pfizer) 3/9/2023   Q1: Little interest or pleasure in doing things 0   Q2: Feeling down, depressed or hopeless 0   PHQ-2 Score 0   Q1: Little interest or pleasure in doing things Not at all   Q2: Feeling down, depressed or hopeless Not at all   PHQ-2 Score 0     Social History     Tobacco Use     Smoking status: Never     Smokeless tobacco: Never   Substance Use Topics     Alcohol use: NONE     Comment: Alcoholic Drinks/day: occasional use     Last PSA:   Prostate Specific Antigen Screen   Date Value Ref Range Status   03/08/2022 0.77 0.00 - 2.50 ug/L Final         Review of Systems   Constitutional: Negative for chills and fever.   HENT: Negative for congestion, ear pain, hearing loss and sore throat.    Eyes:  "Negative for pain and visual disturbance.   Respiratory: Negative for cough and shortness of breath.    Cardiovascular: Negative for chest pain, palpitations and peripheral edema.   Gastrointestinal: Positive for abdominal pain. Negative for constipation, diarrhea, heartburn, hematochezia and nausea.   Genitourinary: Negative for dysuria, frequency, genital sores, hematuria, impotence, penile discharge and urgency.   Musculoskeletal: Negative for arthralgias, joint swelling and myalgias.   Skin: Negative for rash.   Neurological: Negative for dizziness, weakness, headaches and paresthesias.   Psychiatric/Behavioral: Negative for mood changes. The patient is not nervous/anxious.      Dull RUQ pain about 1 1/2 months ago.  Feels bloated.        OBJECTIVE:   /84   Pulse 63   Temp 98.2  F (36.8  C) (Oral)   Resp 20   Ht 1.772 m (5' 9.75\")   Wt 102.1 kg (225 lb)   SpO2 97%   BMI 32.52 kg/m      Wt Readings from Last 4 Encounters:   03/09/23 102.1 kg (225 lb)   03/08/22 98.2 kg (216 lb 9.6 oz)   08/30/21 97.1 kg (214 lb 1 oz)   01/18/21 97.2 kg (214 lb 4 oz)     Physical Exam  GENERAL: healthy, alert and no distress  EYES: Eyes grossly normal to inspection, PERRL and conjunctivae and sclerae normal  HENT: ear canals and TM's normal, nose and mouth without ulcers or lesions  NECK: no adenopathy, no asymmetry, masses, or scars and thyroid normal to palpation  RESP: lungs clear to auscultation - no rales, rhonchi or wheezes  CV: regular rate and rhythm, normal S1 S2, no S3 or S4, no murmur, click or rub, no peripheral edema and peripheral pulses strong  ABDOMEN: soft,  no hepatosplenomegaly, no masses and bowel sounds normal         Mild tx with deep palpation of the RUQ with positive Baeza  RECTAL:  DECLINES THIS YEAR (LAST EXAM 3-8-22)  WILL START PSA SCREENING AT AGE 44.   MS: no gross musculoskeletal defects noted, no edema  SKIN: no suspicious lesions or rashes  NEURO: Normal strength and tone, mentation " intact and speech normal  PSYCH: mentation appears normal, affect normal/bright        ASSESSMENT/PLAN:       ICD-10-CM    1. Healthcare maintenance  Z00.00       2. Primary hypertension, well controlled I10 lisinopril (ZESTRIL) 20 MG tablet     metoprolol succinate ER (TOPROL XL) 50 MG 24 hr tablet      3. Palpitations  R00.2       4. Erectile dysfunction, unspecified erectile dysfunction type  N52.9 sildenafil (REVATIO) 20 MG tablet      5. Hyperlipidemia, unspecified hyperlipidemia type, stable on statin E78.5 simvastatin (ZOCOR) 10 MG tablet      6. RUQ abdominal pain, past 1-2 months R10.11 US Abdomen Limited      7. Class 1 obesity due to excess calories without serious comorbidity with body mass index (BMI) of 32.0 to 32.9 in adult  E66.09     Z68.32           Plan:        Start screening for colon cancer at age 44    Continue yearly PSA screening    Fasting labs    Work on weight loss of 10-20 lbs    Limited Abdominal Ultrasound to evaluate RUQ pain.    (Would like to have this done at [employer] where his wife works)     Patient has been advised of split billing requirements and indicates understanding: Yes      COUNSELING:   Reviewed preventive health counseling, as reflected in patient instructions       Regular exercise       Healthy diet/nutrition        He reports that he has never smoked. He has never used smokeless tobacco.        Jeffy Georges MD  [facility]

## 2023-03-09 NOTE — LETTER
March 14, 2023      Elijah Blood  [address]  [place] [facility] [address]        Dear ,  We are writing to inform you of your test results.    Luis Nava:  Your PSA is normal and stable.  The PSA should be rechecked in one year.  The cholesterol panel is good.  The white count is negligibly elevated.  Recheck the white count in a month through a follow up lab appt.  The remaining labs are normal.      Resulted Orders   CBC with platelets   Result Value Ref Range    WBC Count 11.5 (H) 4.0 - 11.0 10e3/uL    RBC Count 5.46 4.40 - 5.90 10e6/uL    Hemoglobin 15.4 13.3 - 17.7 g/dL    Hematocrit 44.2 40.0 - 53.0 %    MCV 81 78 - 100 fL    MCH 28.2 26.5 - 33.0 pg    MCHC 34.8 31.5 - 36.5 g/dL    RDW 12.4 10.0 - 15.0 %    Platelet Count 287 150 - 450 10e3/uL   Comprehensive metabolic panel (BMP + Alb, Alk Phos, ALT, AST, Total. Bili, TP)   Result Value Ref Range    Sodium 136 136 - 145 mmol/L    Potassium 3.8 3.4 - 5.3 mmol/L    Chloride 98 98 - 107 mmol/L    Carbon Dioxide (CO2) 23 22 - 29 mmol/L    Anion Gap 15 7 - 15 mmol/L    Urea Nitrogen 13.9 6.0 - 20.0 mg/dL    Creatinine 1.10 0.67 - 1.17 mg/dL    Calcium 9.9 8.6 - 10.0 mg/dL    Glucose 84 70 - 99 mg/dL    Alkaline Phosphatase 70 40 - 129 U/L    AST 32 10 - 50 U/L    ALT 40 10 - 50 U/L    Protein Total 8.2 6.4 - 8.3 g/dL    Albumin 4.9 3.5 - 5.2 g/dL    Bilirubin Total 0.5 <=1.2 mg/dL    GFR Estimate 85 >60 mL/min/1.73m2      Comment:      eGFR calculated using 2021 CKD-EPI equation.   Lipid panel reflex to direct LDL Fasting   Result Value Ref Range    Cholesterol 173 <200 mg/dL    Triglycerides 128 <150 mg/dL    Direct Measure HDL 41 >=40 mg/dL    LDL Cholesterol Calculated 106 (H) <=100 mg/dL    Non HDL Cholesterol 132 (H) <130 mg/dL    Narrative    Cholesterol  Desirable:  <200 mg/dL    Triglycerides  Normal:  Less than 150 mg/dL  Borderline High:  150-199 mg/dL  High:  200-499 mg/dL  Very High:  Greater than or equal to 500 mg/dL    Direct  Measure HDL  Female:  Greater than or equal to 50 mg/dL   Male:  Greater than or equal to 40 mg/dL    LDL Cholesterol  Desirable:  <100mg/dL  Above Desirable:  100-129 mg/dL   Borderline High:  130-159 mg/dL   High:  160-189 mg/dL   Very High:  >= 190 mg/dL    Non HDL Cholesterol  Desirable:  130 mg/dL  Above Desirable:  130-159 mg/dL  Borderline High:  160-189 mg/dL  High:  190-219 mg/dL  Very High:  Greater than or equal to 220 mg/dL   Vitamin D Deficiency   Result Value Ref Range    Vitamin D, Total (25-Hydroxy) 54 20 - 75 ug/L    Narrative    Season, race, dietary intake, and treatment affect the concentration of 25-hydroxy-Vitamin D. Values may decrease during winter months and increase during summer months. Values 20-29 ug/L may indicate Vitamin D insufficiency and values <20 ug/L may indicate Vitamin D deficiency.    Vitamin D determination is routinely performed by an immunoassay specific for 25 hydroxyvitamin D3.  If an individual is on vitamin D2(ergocalciferol) supplementation, please specify 25 OH vitamin D2 and D3 level determination by LCMSMS test VITD23.     PSA, screen   Result Value Ref Range    Prostate Specific Antigen Screen 0.54 0.00 - 2.50 ng/mL    Narrative    This result is obtained using the Roche Elecsys total PSA method on the liyah e801 immunoassay analyzer. Results obtained with different assay methods or kits cannot be used interchangeably.       If you have any questions or concerns, please call the clinic at the number listed above.       Sincerely,      Jeffy Georges MD

## 2023-03-09 NOTE — LETTER
March 14, 2023      Elijah Blood  [address]  [place] [facility] [address]        Dear ,  We are writing to inform you of your test results.    Luis Nava:  Your PSA is normal and stable.  The PSA should be rechecked in one year.  The cholesterol panel is good.  The white count is negligibly elevated.  Recheck the white count in a month through a follow up lab appt.  The remaining labs are cm.    Resulted Orders   CBC with platelets   Result Value Ref Range    WBC Count 11.5 (H) 4.0 - 11.0 10e3/uL    RBC Count 5.46 4.40 - 5.90 10e6/uL    Hemoglobin 15.4 13.3 - 17.7 g/dL    Hematocrit 44.2 40.0 - 53.0 %    MCV 81 78 - 100 fL    MCH 28.2 26.5 - 33.0 pg    MCHC 34.8 31.5 - 36.5 g/dL    RDW 12.4 10.0 - 15.0 %    Platelet Count 287 150 - 450 10e3/uL   Comprehensive metabolic panel (BMP + Alb, Alk Phos, ALT, AST, Total. Bili, TP)   Result Value Ref Range    Sodium 136 136 - 145 mmol/L    Potassium 3.8 3.4 - 5.3 mmol/L    Chloride 98 98 - 107 mmol/L    Carbon Dioxide (CO2) 23 22 - 29 mmol/L    Anion Gap 15 7 - 15 mmol/L    Urea Nitrogen 13.9 6.0 - 20.0 mg/dL    Creatinine 1.10 0.67 - 1.17 mg/dL    Calcium 9.9 8.6 - 10.0 mg/dL    Glucose 84 70 - 99 mg/dL    Alkaline Phosphatase 70 40 - 129 U/L    AST 32 10 - 50 U/L    ALT 40 10 - 50 U/L    Protein Total 8.2 6.4 - 8.3 g/dL    Albumin 4.9 3.5 - 5.2 g/dL    Bilirubin Total 0.5 <=1.2 mg/dL    GFR Estimate 85 >60 mL/min/1.73m2      Comment:      eGFR calculated using 2021 CKD-EPI equation.   Lipid panel reflex to direct LDL Fasting   Result Value Ref Range    Cholesterol 173 <200 mg/dL    Triglycerides 128 <150 mg/dL    Direct Measure HDL 41 >=40 mg/dL    LDL Cholesterol Calculated 106 (H) <=100 mg/dL    Non HDL Cholesterol 132 (H) <130 mg/dL    Narrative    Cholesterol  Desirable:  <200 mg/dL    Triglycerides  Normal:  Less than 150 mg/dL  Borderline High:  150-199 mg/dL  High:  200-499 mg/dL  Very High:  Greater than or equal to 500 mg/dL    Direct Measure  HDL  Female:  Greater than or equal to 50 mg/dL   Male:  Greater than or equal to 40 mg/dL    LDL Cholesterol  Desirable:  <100mg/dL  Above Desirable:  100-129 mg/dL   Borderline High:  130-159 mg/dL   High:  160-189 mg/dL   Very High:  >= 190 mg/dL    Non HDL Cholesterol  Desirable:  130 mg/dL  Above Desirable:  130-159 mg/dL  Borderline High:  160-189 mg/dL  High:  190-219 mg/dL  Very High:  Greater than or equal to 220 mg/dL   Vitamin D Deficiency   Result Value Ref Range    Vitamin D, Total (25-Hydroxy) 54 20 - 75 ug/L    Narrative    Season, race, dietary intake, and treatment affect the concentration of 25-hydroxy-Vitamin D. Values may decrease during winter months and increase during summer months. Values 20-29 ug/L may indicate Vitamin D insufficiency and values <20 ug/L may indicate Vitamin D deficiency.    Vitamin D determination is routinely performed by an immunoassay specific for 25 hydroxyvitamin D3.  If an individual is on vitamin D2(ergocalciferol) supplementation, please specify 25 OH vitamin D2 and D3 level determination by LCMSMS test VITD23.     PSA, screen   Result Value Ref Range    Prostate Specific Antigen Screen 0.54 0.00 - 2.50 ng/mL    Narrative    This result is obtained using the Roche Elecsys total PSA method on the liyah e801 immunoassay analyzer. Results obtained with different assay methods or kits cannot be used interchangeably.       If you have any questions or concerns, please call the clinic at the number listed above.       Sincerely,      Jeffy Georges MD

## 2023-03-09 NOTE — PATIENT INSTRUCTIONS
Start screening for colon cancer at age 44    Continue yearly PSA screening    Fasting labs    Work on weight loss of 10-20 lbs    Limited Abdominal Ultrasound to evaluate RUQ pain.    (Would like to have this done at [facility] where his wife works)

## 2023-03-13 LAB — DEPRECATED CALCIDIOL+CALCIFEROL SERPL-MC: 54 UG/L (ref 20–75)

## 2023-03-15 ENCOUNTER — MYC MEDICAL ADVICE (OUTPATIENT)
Dept: FAMILY MEDICINE | Facility: CLINIC | Age: 43
End: 2023-03-15
Payer: COMMERCIAL

## 2023-03-15 DIAGNOSIS — R10.11 ABDOMINAL PAIN, RIGHT UPPER QUADRANT: Primary | ICD-10-CM

## 2023-03-15 NOTE — TELEPHONE ENCOUNTER
Please check into the RUQ ultrasound I ordered at last visit.  Pt would like this done at [facility] and has not heard back yet.    Please assist the pt in getting this scheduled and then give him a call.    Thanks.

## 2023-03-21 ENCOUNTER — TRANSFERRED RECORDS (OUTPATIENT)
Dept: HEALTH INFORMATION MANAGEMENT | Facility: CLINIC | Age: 43
End: 2023-03-21

## 2023-03-24 NOTE — TELEPHONE ENCOUNTER
I need this imaging report. (? Done at [facility])  Please see if we can get it in his record or on my desk.  Thanks.l

## 2023-03-27 NOTE — TELEPHONE ENCOUNTER
After speaking today and learning of your past IV dye/contrast reaction(hives around age 19) I would like you NOT to do the CT at present.  Please disregard any requests to schedule this.    We will in the meantime plan on doing a HIDA scan of the biliary dystem to look for any dysfucntion of the gallbladder.  I will request this be done at [facility].      I reviewed your recent normal kidney function.      Last Renal Panel:  Sodium   Date Value Ref Range Status   03/09/2023 136 136 - 145 mmol/L Final     Potassium   Date Value Ref Range Status   03/09/2023 3.8 3.4 - 5.3 mmol/L Final   03/08/2022 4.4 3.5 - 5.0 mmol/L Final     Chloride   Date Value Ref Range Status   03/09/2023 98 98 - 107 mmol/L Final   03/08/2022 100 98 - 107 mmol/L Final     Carbon Dioxide (CO2)   Date Value Ref Range Status   03/09/2023 23 22 - 29 mmol/L Final   03/08/2022 27 22 - 31 mmol/L Final     Anion Gap   Date Value Ref Range Status   03/09/2023 15 7 - 15 mmol/L Final   03/08/2022 11 5 - 18 mmol/L Final     Glucose   Date Value Ref Range Status   03/09/2023 84 70 - 99 mg/dL Final   03/08/2022 88 70 - 125 mg/dL Final     Urea Nitrogen   Date Value Ref Range Status   03/09/2023 13.9 6.0 - 20.0 mg/dL Final   03/08/2022 16 8 - 22 mg/dL Final     Creatinine   Date Value Ref Range Status   03/09/2023 1.10 0.67 - 1.17 mg/dL Final     GFR Estimate   Date Value Ref Range Status   03/09/2023 85 >60 mL/min/1.73m2 Final     Comment:     eGFR calculated using 2021 CKD-EPI equation.   01/18/2021 >60 >60 mL/min/1.73m2 Final     Calcium   Date Value Ref Range Status   03/09/2023 9.9 8.6 - 10.0 mg/dL Final     Albumin   Date Value Ref Range Status   03/09/2023 4.9 3.5 - 5.2 g/dL Final   03/08/2022 4.5 3.5 - 5.0 g/dL Final

## 2023-03-31 NOTE — TELEPHONE ENCOUNTER
Hi Team,    Can we fax the referral for this pt's HIDA scan to [facility]?    Thank you :)    Memo Vigil RN     [facility]

## 2023-04-03 ENCOUNTER — TRANSFERRED RECORDS (OUTPATIENT)
Dept: HEALTH INFORMATION MANAGEMENT | Facility: CLINIC | Age: 43
End: 2023-04-03
Payer: COMMERCIAL

## 2023-04-28 ENCOUNTER — TELEPHONE (OUTPATIENT)
Dept: FAMILY MEDICINE | Facility: CLINIC | Age: 43
End: 2023-04-28
Payer: COMMERCIAL

## 2023-04-28 NOTE — TELEPHONE ENCOUNTER
----- Message from Jeffy Georges MD sent at 4/26/2023  4:43 PM CDT -----  Luis Nava:  Your recent ultrasound was normal other than the finding of some fatty liver disease.  If you are still having the same symptoms then please schedule a follow up appt.

## 2023-05-30 ENCOUNTER — OFFICE VISIT (OUTPATIENT)
Dept: FAMILY MEDICINE | Facility: CLINIC | Age: 43
End: 2023-05-30
Payer: OTHER MISCELLANEOUS

## 2023-05-30 VITALS
SYSTOLIC BLOOD PRESSURE: 144 MMHG | TEMPERATURE: 98.5 F | BODY MASS INDEX: 31.39 KG/M2 | RESPIRATION RATE: 12 BRPM | WEIGHT: 219.25 LBS | HEART RATE: 66 BPM | DIASTOLIC BLOOD PRESSURE: 82 MMHG | OXYGEN SATURATION: 98 % | HEIGHT: 70 IN

## 2023-05-30 DIAGNOSIS — T59.91XD: ICD-10-CM

## 2023-05-30 DIAGNOSIS — R91.8 PULMONARY NODULES: ICD-10-CM

## 2023-05-30 PROCEDURE — 99213 OFFICE O/P EST LOW 20 MIN: CPT | Performed by: FAMILY MEDICINE

## 2023-05-30 RX ORDER — ALBUTEROL SULFATE 90 UG/1
1-2 AEROSOL, METERED RESPIRATORY (INHALATION)
COMMUNITY
Start: 2023-05-13 | End: 2023-06-05

## 2023-05-30 NOTE — PATIENT INSTRUCTIONS
Work comp form completed    Non-contrast CT of the Chest to evaluate pulmonary nodules    Referral for Pulmonary consultation    Use albuterol inhaler as needed but no more than every four hours.    Follow up in a month.

## 2023-05-30 NOTE — PROGRESS NOTES
"      Subjective   Elijah is a 42 year old, presenting for the following health issues:  Hospital F/U and Work Comp         View : No data to display.              HPI     Inhalation exposure on 5-12-23.  Now feels \"pretty good\".  Uses his inhaler quite a bit.   About every 4-5 hours.  Did take 5-6 days of prednisone.      Hospital Follow-up Visit:    Hospital/Nursing Home/IP Rehab Facility: [facility]  Date of Admission: 5/12/23  Date of Discharge: 5/13/23  Reason(s) for Admission: Chlorine inhalation    Was your hospitalization related to COVID-19? No   Problems taking medications regularly:  None  Medication changes since discharge: None  Problems adhering to non-medication therapy:  None    Summary of hospitalization:  [facility] hospital discharge summary reviewed  Diagnostic Tests/Treatments reviewed.  Follow up needed: none  Other Healthcare Providers Involved in Patient s Care:         None  Update since discharge: improved.   Plan of care communicated with patient           Objective    BP (!) 144/82 (BP Location: Left arm, Patient Position: Sitting, Cuff Size: Adult Regular)   Pulse 66   Temp 98.5  F (36.9  C) (Oral)   Resp 12   Ht 1.772 m (5' 9.75\")   Wt 99.5 kg (219 lb 4 oz)   SpO2 98%   BMI 31.69 kg/m    Body mass index is 31.69 kg/m .  Physical Exam   GENERAL: healthy, alert and no distress  RESP: lungs clear to auscultation - no rales, rhonchi or wheezes  CV: regular rate and rhythm, normal S1 S2, no S3 or S4, no murmur, click or rub, no peripheral edema and peripheral pulses strong  MS: no gross musculoskeletal defects noted, no edema          XR Chest 2 Views  Order: [id number]  Narrative    EXAM: XR CHEST 2 VIEWS   LOCATION: [facility]   DATE/TIME: 5/12/2023 1:34 PM CDT     INDICATION: Chemical exposure, shortness of breath   COMPARISON: None.     IMPRESSION: No focal consolidation, effusion, or pneumothorax. There is a left perihilar nodule measuring 2 cm. This is favored to " represent a calcified lymph node given the 3 small calcified granulomas in the left perihilar region. If no priors are available for comparison a CT could be performed for confirmation. Heart size is normal. No acute osseous findings.  Exam End: 05/12/23  1:34 PM    Specimen Collected: 05/12/23  1:34 PM Last Resulted: 05/12/23  2:08 PM   Received From: [employer]  Result Received: 05/13/23  6:39 PM       Encounter Diagnoses   Name Primary?     Inhalation of gaseous substance, accidental or unintentional, subsequent encounter, mild residual symptoms      Pulmonary nodules, multiple with a dominant 2 cm left perihilar nodule.        PLAN:        Work comp form completed    Non-contrast CT of the Chest to evaluate pulmonary nodules    Referral for Pulmonary consultation    Use albuterol inhaler as needed but no more than every four hours.    Follow up in a month.

## 2023-05-31 DIAGNOSIS — T59.91XD: Primary | ICD-10-CM

## 2023-05-31 DIAGNOSIS — R06.02 SOB (SHORTNESS OF BREATH): ICD-10-CM

## 2023-05-31 DIAGNOSIS — Z77.098 CHEMICAL EXPOSURE: ICD-10-CM

## 2023-06-05 ENCOUNTER — HOSPITAL ENCOUNTER (OUTPATIENT)
Dept: CT IMAGING | Facility: HOSPITAL | Age: 43
Discharge: HOME | End: 2023-06-05
Attending: FAMILY MEDICINE | Admitting: FAMILY MEDICINE
Payer: OTHER MISCELLANEOUS

## 2023-06-05 DIAGNOSIS — R91.8 PULMONARY NODULES: ICD-10-CM

## 2023-06-05 DIAGNOSIS — R06.2 WHEEZING: Primary | ICD-10-CM

## 2023-06-05 PROCEDURE — 71250 CT THORAX DX C-: CPT

## 2023-06-05 RX ORDER — ALBUTEROL SULFATE 90 UG/1
1-2 AEROSOL, METERED RESPIRATORY (INHALATION) EVERY 4 HOURS PRN
Qty: 18 G | Refills: 0 | Status: SHIPPED | OUTPATIENT
Start: 2023-06-05 | End: 2023-08-09

## 2023-06-05 NOTE — TELEPHONE ENCOUNTER
Patient was informed.  He is doing fine, but was told to take this every 4 hours.  I advised that if he starts getting low on this medication and feels he will need a refill, he should schedule an appt to be seen before he runs out.

## 2023-06-05 NOTE — TELEPHONE ENCOUNTER
Call [facility] with questions or concerns.  [phone]    Patient is out of medication.  Was seen on 5-30-23 with Dr. Georges.  Medication listed as historical.  Please send medication as soon as possible.

## 2023-08-09 ENCOUNTER — OFFICE VISIT (OUTPATIENT)
Dept: PULMONOLOGY | Facility: CLINIC | Age: 43
End: 2023-08-09
Payer: OTHER MISCELLANEOUS

## 2023-08-09 ENCOUNTER — ALLIED HEALTH/NURSE VISIT (OUTPATIENT)
Dept: PULMONOLOGY | Facility: CLINIC | Age: 43
End: 2023-08-09
Payer: OTHER MISCELLANEOUS

## 2023-08-09 VITALS
HEART RATE: 70 BPM | SYSTOLIC BLOOD PRESSURE: 138 MMHG | DIASTOLIC BLOOD PRESSURE: 84 MMHG | HEIGHT: 70 IN | WEIGHT: 217.6 LBS | BODY MASS INDEX: 31.15 KG/M2 | OXYGEN SATURATION: 95 %

## 2023-08-09 DIAGNOSIS — R06.2 WHEEZING: ICD-10-CM

## 2023-08-09 DIAGNOSIS — T59.91XD: ICD-10-CM

## 2023-08-09 DIAGNOSIS — R06.02 SOB (SHORTNESS OF BREATH): ICD-10-CM

## 2023-08-09 DIAGNOSIS — Z77.098 CHEMICAL EXPOSURE: ICD-10-CM

## 2023-08-09 LAB — HGB BLD-MCNC: 13.5 G/DL

## 2023-08-09 PROCEDURE — 94729 DIFFUSING CAPACITY: CPT | Performed by: INTERNAL MEDICINE

## 2023-08-09 PROCEDURE — 99205 OFFICE O/P NEW HI 60 MIN: CPT | Performed by: INTERNAL MEDICINE

## 2023-08-09 PROCEDURE — 94060 EVALUATION OF WHEEZING: CPT | Performed by: INTERNAL MEDICINE

## 2023-08-09 PROCEDURE — 94726 PLETHYSMOGRAPHY LUNG VOLUMES: CPT | Performed by: INTERNAL MEDICINE

## 2023-08-09 PROCEDURE — 85018 HEMOGLOBIN: CPT | Performed by: INTERNAL MEDICINE

## 2023-08-09 RX ORDER — ALBUTEROL SULFATE 90 UG/1
1-2 AEROSOL, METERED RESPIRATORY (INHALATION) EVERY 4 HOURS PRN
Qty: 18 G | Refills: 11 | Status: SHIPPED | OUTPATIENT
Start: 2023-08-09

## 2023-08-09 NOTE — LETTER
8/9/2023         RE: Elijah Blood  [address]  [place] [address]        Dear Colleague,    Thank you for referring your patient, Elijah Blood, to the [facility]. Please see a copy of my visit note below.    Pulmonary Clinic Outpatient Consultation    Assessment and Plan:   42 year old male with a history of never smoker with a history of GERD, dyslipidemia, HTN, seasonal allergies, heavy exposure to chlorine and hydrochloric acid gas on 5/12/23 resulting in severe reactive airways dysfunction syndrome and acute hypoxic respiratory failure requiring hospitalization at [facility], non-rebreather oxygen, and nebulized bicarbonate, presenting for evaluation.    Reactive airways dysfunction syndrome, exposure to chlorine and hydrochloric acid gas, possible irritant-induced asthma: Elijah experienced severe reactive airways dysfunction syndrome from chlorine and hydrochloric acid gas exposure on 5/12/23, with severe hypoxia almost needing intubation, improved with nebulized bicarbonate. He has clinically improved, but still needing albuterol occasionally. Normal complete PFT, but need to obtain methacholine challenge to rule out irritant-induced asthma. Fortunately he is gradually improving clinically, but still has significant sensitivity to perfumes, scents, smoke, etc.    Plan:  - albuterol HFA as needed  - methacholine challenge test; I will contact him with results  - follow up as needed and depending on above testing  - recommend remaining up to date with respiratory vaccinations  - encouraged him to contact us with questions or concerning symptoms    Cayden Orellana MD  [facility]  Office [phone]  Pager [phone]  he/him    CCx: reactive airways dysfunction syndrome, exposure to chlorine and hydrochloric acid gas, possible irritant-induced asthma    HPI: 42 year old male with a history of never smoker with a history of GERD,  dyslipidemia, HTN, seasonal allergies, heavy exposure to chlorine and hydrochloric acid gas on 5/12/23 resulting in severe reactive airways dysfunction syndrome and acute hypoxic respiratory failure requiring hospitalization at [facility], non-rebreather oxygen, and nebulized bicarbonate, presenting for evaluation. Elijah works in property maintenance, including pool maintenance. On 5/12/23, a pool system at a property he maintains had a malfunction with backflow of chlorine and hydrochloric acid which pooled on the floor of a pump room. He went into the room to turn off the pump and was exposed to a heavy exposure to chlorine and hydrochloric acid gas. In response to the incident, EMS, fire, and police arrived. A paramedic noted him to be pallid and cyanotic, and his SpO2 was 60%. He was brought to [facility] ED, where he required 15 L/min NRB mask but SpO2 was still in the 80s. He received IV methylprednisolone, then received two doses of nebulized bicarbonate leading to improved oxygenation. He was admitted overnight and discharged the following day, no longer requiring supplemental oxygen. Since that time, he has slowly improved. He is back at the gym, trying to get back his prior fitness level. Not wheezing, and uses albuterol infrequently. He is very sensitive to smells, including perfumes, food, chemicals, campfires; his work in property maintenance, of course, exposes him to a lot. They had backflow preventers and new feeders installed at the pool in the building where he was exposed. His chest CT on 6/5/23 showed no parenchymal disease, a left lung calcified granuloma and calcified left hilar node likely from burnt-out histo, a few very tiny DAMEON nodules. Has occasional allergies. Mother with COPD on oxygen, heavy smoker. Patient never smoked but exposed to second-hand smoke growing up from mother.    ROS:  A 12-system review was obtained and was negative with the exception of the symptoms endorsed in the  history of present illness.    PMH:  GERD, dyslipidemia, HTN, seasonal allergies, heavy exposure to chlorine and hydrochloric acid gas on 5/12/23 resulting in severe reactive airways dysfunction syndrome and acute hypoxic respiratory failure requiring hospitalization at [facility], non-rebreather oxygen, and nebulized bicarbonate    PSH:  Past Surgical History:   Procedure Laterality Date     HC EXCISION SUBMAXILLARY GLAND      Description: Surgery Excision Of Submandibular (Submaxillary) Gland;  Recorded: 02/09/2010;  Comments: stones       Allergies:  Allergies   Allergen Reactions     Azithromycin Itching and Rash     Rash on neck, behind ears after each dose of azithromycin.         Family HX:  Family History   Problem Relation Age of Onset     Hypertension Other      Hyperlipidemia Other      Coronary Artery Disease No family hx of        Social Hx:  Social History     Socioeconomic History     Marital status:      Spouse name: Not on file     Number of children: Not on file     Years of education: Not on file     Highest education level: Not on file   Occupational History     Not on file   Tobacco Use     Smoking status: Never     Passive exposure: Never     Smokeless tobacco: Never   Vaping Use     Vaping Use: Never used   Substance and Sexual Activity     Alcohol use: Yes     Comment: Alcoholic Drinks/day: occasional use     Drug use: No     Sexual activity: Not on file   Other Topics Concern     Not on file   Social History Narrative     Not on file     Social Determinants of Health     Financial Resource Strain: Not on file   Food Insecurity: Not on file   Transportation Needs: Not on file   Physical Activity: Not on file   Stress: Not on file   Social Connections: Not on file   Intimate Partner Violence: Not on file   Housing Stability: Not on file       Current Meds:  Current Outpatient Medications   Medication Sig Dispense Refill     albuterol (PROAIR HFA/PROVENTIL HFA/VENTOLIN HFA) 108 (90 Base)  "MCG/ACT inhaler Inhale 1-2 puffs into the lungs every 4 hours as needed for shortness of breath, wheezing or cough 18 g 11     aspirin 81 MG EC tablet [ASPIRIN 81 MG EC TABLET] Take 1 tablet (81 mg total) by mouth daily.       lisinopril (ZESTRIL) 20 MG tablet [LISINOPRIL (PRINIVIL,ZESTRIL) 20 MG TABLET] TAKE 1 TABLET BY MOUTH EVERY DAY 90 tablet 3     magnesium 30 MG tablet Take 1 tablet (30 mg) by mouth daily       metoprolol succinate ER (TOPROL XL) 50 MG 24 hr tablet Take 1 tablet (50 mg) by mouth daily 90 tablet 3     multivit-min/folic/vit K/lycop (MEN'S MULTIVITAMIN ORAL) [MULTIVIT-MIN/FOLIC/VIT K/LYCOP (MEN'S MULTIVITAMIN ORAL)] Take 1 tablet by mouth daily.       pantoprazole (PROTONIX) 40 MG EC tablet TAKE 1 TABLET BY MOUTH ONE TIME DAILY 90 tablet 3     sildenafil (REVATIO) 20 MG tablet TAKE ONE TO THREE TABS ORALLY 30 MIN TO FOUR HOURS PRIOR TO INTERCOURSE 30 tablet 5     simvastatin (ZOCOR) 10 MG tablet TAKE 1 TABLET BY MOUTH EVERY DAY IN THE EVENING 90 tablet 3       Physical Exam:  /84 (BP Location: Left arm, Patient Position: Chair, Cuff Size: Adult Regular)   Pulse 70   Ht 1.765 m (5' 9.5\")   Wt 98.7 kg (217 lb 9.6 oz)   SpO2 95%   BMI 31.67 kg/m    Gen: alert, oriented, no distress  HEENT: nasal mucosa is unremarkable, no oropharyngeal lesions, no cervical or supraclavicular lymphadenopathy  CV: RRR, no M/G/R  Resp: CTAB, no focal crackles or wheezes  Skin: no apparent rashes  Ext: no cyanosis, clubbing or edema  Neuro: alert, nonfocal    Labs:  reviewed    Imaging:  Chest CT (June 2023):  - images directly reviewed, formal interpretation follows:  FINDINGS:   LUNGS AND PLEURA: Benign 5 mm calcified granuloma left upper lobe. Three 2 mm left upper lobe nodules are nonspecific but probably represent noncalcified benign granulomas (series 3 image 106, 122 and 145). Lungs are otherwise clear. No pleural effusion.     MEDIASTINUM/AXILLAE: Benign 2.3 cm and three 0.3 cm calcified left hilar " lymph nodes. No lymphadenopathy. Heart size normal. No pericardial effusion. Normal caliber thoracic aorta and central pulmonary arteries.     CORONARY ARTERY CALCIFICATION: None.     UPPER ABDOMEN: No significant finding.     MUSCULOSKELETAL: Unremarkable.                                                                      IMPRESSION:   1.  Benign calcified left hilar lymph node and benign calcified granuloma in the left upper lobe account for recent radiographic findings and require no further follow-up.  2.  Three 2 mm left upper lobe nodules are nonspecific but probably represent noncalcified benign granulomas. Please refer to management guidelines below.     REFERENCE:  Guidelines for Management of Incidental Pulmonary Nodules Detected on CT Images: From the Fleischner Society 2017.   Guidelines apply to incidental nodules in patients who are 35 years or older.  Guidelines do not apply to lung cancer screening, patients with immunosuppression, or patients with known primary cancer.     MULTIPLE NODULES  Nodule size <6 mm  Low-risk patients: No follow-up needed.  High-risk patients: Optional follow-up at 12 months.    TTE (November 2015):   Summary   1. Normal left ventricular size and systolic performance. The ejection   fraction is estimated to be 60%.   2. No significant valvular heart disease.    Pulmonary Function Testing  August 2023  FVC 6.62 (141%)  FEV1 4.79 (126%)  FEV1/FVC 0.72  No bronchodilator response  RV 2.42 (147%)  TLC 9.14 (129%)  DLCOc 124%    Time spent on chart and image review, meeting with the patient to obtain history, perform physical exam, discuss test results, diagnostic possibilities, further testing options, treatment plan options, and care coordination: 65 minutes      Again, thank you for allowing me to participate in the care of your patient.        Sincerely,        Cayden Orellana MD

## 2023-08-09 NOTE — PROGRESS NOTES
Pulmonary Clinic Outpatient Consultation    Assessment and Plan:   42 year old male with a history of never smoker with a history of GERD, dyslipidemia, HTN, seasonal allergies, heavy exposure to chlorine and hydrochloric acid gas on 5/12/23 resulting in severe reactive airways dysfunction syndrome and acute hypoxic respiratory failure requiring hospitalization at [facility], non-rebreather oxygen, and nebulized bicarbonate, presenting for evaluation.    Reactive airways dysfunction syndrome, exposure to chlorine and hydrochloric acid gas, possible irritant-induced asthma: Elijah experienced severe reactive airways dysfunction syndrome from chlorine and hydrochloric acid gas exposure on 5/12/23, with severe hypoxia almost needing intubation, improved with nebulized bicarbonate. He has clinically improved, but still needing albuterol occasionally. Normal complete PFT, but need to obtain methacholine challenge to rule out irritant-induced asthma. Fortunately he is gradually improving clinically, but still has significant sensitivity to perfumes, scents, smoke, etc.    Plan:  - albuterol HFA as needed  - methacholine challenge test; I will contact him with results  - follow up as needed and depending on above testing  - recommend remaining up to date with respiratory vaccinations  - encouraged him to contact us with questions or concerning symptoms    Cayden Orellana MD  [facility]  Office [phone]  Pager [phone]  he/him    CCx: reactive airways dysfunction syndrome, exposure to chlorine and hydrochloric acid gas, possible irritant-induced asthma    HPI: 42 year old male with a history of never smoker with a history of GERD, dyslipidemia, HTN, seasonal allergies, heavy exposure to chlorine and hydrochloric acid gas on 5/12/23 resulting in severe reactive airways dysfunction syndrome and acute hypoxic respiratory failure requiring hospitalization at [facility], non-rebreather oxygen, and nebulized  bicarbonate, presenting for evaluation. Elijah works in property maintenance, including pool maintenance. On 5/12/23, a pool system at a property he maintains had a malfunction with backflow of chlorine and hydrochloric acid which pooled on the floor of a pump room. He went into the room to turn off the pump and was exposed to a heavy exposure to chlorine and hydrochloric acid gas. In response to the incident, EMS, fire, and police arrived. A paramedic noted him to be pallid and cyanotic, and his SpO2 was 60%. He was brought to [facility] ED, where he required 15 L/min NRB mask but SpO2 was still in the 80s. He received IV methylprednisolone, then received two doses of nebulized bicarbonate leading to improved oxygenation. He was admitted overnight and discharged the following day, no longer requiring supplemental oxygen. Since that time, he has slowly improved. He is back at the gym, trying to get back his prior fitness level. Not wheezing, and uses albuterol infrequently. He is very sensitive to smells, including perfumes, food, chemicals, campfires; his work in property maintenance, of course, exposes him to a lot. They had backflow preventers and new feeders installed at the pool in the building where he was exposed. His chest CT on 6/5/23 showed no parenchymal disease, a left lung calcified granuloma and calcified left hilar node likely from burnt-out histo, a few very tiny DAMEON nodules. Has occasional allergies. Mother with COPD on oxygen, heavy smoker. Patient never smoked but exposed to second-hand smoke growing up from mother.    ROS:  A 12-system review was obtained and was negative with the exception of the symptoms endorsed in the history of present illness.    PMH:  GERD, dyslipidemia, HTN, seasonal allergies, heavy exposure to chlorine and hydrochloric acid gas on 5/12/23 resulting in severe reactive airways dysfunction syndrome and acute hypoxic respiratory failure requiring hospitalization at [facility],  non-rebreather oxygen, and nebulized bicarbonate    PSH:  Past Surgical History:   Procedure Laterality Date    HC EXCISION SUBMAXILLARY GLAND      Description: Surgery Excision Of Submandibular (Submaxillary) Gland;  Recorded: 02/09/2010;  Comments: stones       Allergies:  Allergies   Allergen Reactions    Azithromycin Itching and Rash     Rash on neck, behind ears after each dose of azithromycin.         Family HX:  Family History   Problem Relation Age of Onset    Hypertension Other     Hyperlipidemia Other     Coronary Artery Disease No family hx of        Social Hx:  Social History     Socioeconomic History    Marital status:      Spouse name: Not on file    Number of children: Not on file    Years of education: Not on file    Highest education level: Not on file   Occupational History    Not on file   Tobacco Use    Smoking status: Never     Passive exposure: Never    Smokeless tobacco: Never   Vaping Use    Vaping Use: Never used   Substance and Sexual Activity    Alcohol use: Yes     Comment: Alcoholic Drinks/day: occasional use    Drug use: No    Sexual activity: Not on file   Other Topics Concern    Not on file   Social History Narrative    Not on file     Social Determinants of Health     Financial Resource Strain: Not on file   Food Insecurity: Not on file   Transportation Needs: Not on file   Physical Activity: Not on file   Stress: Not on file   Social Connections: Not on file   Intimate Partner Violence: Not on file   Housing Stability: Not on file       Current Meds:  Current Outpatient Medications   Medication Sig Dispense Refill    albuterol (PROAIR HFA/PROVENTIL HFA/VENTOLIN HFA) 108 (90 Base) MCG/ACT inhaler Inhale 1-2 puffs into the lungs every 4 hours as needed for shortness of breath, wheezing or cough 18 g 11    aspirin 81 MG EC tablet [ASPIRIN 81 MG EC TABLET] Take 1 tablet (81 mg total) by mouth daily.      lisinopril (ZESTRIL) 20 MG tablet [LISINOPRIL (PRINIVIL,ZESTRIL) 20 MG  "TABLET] TAKE 1 TABLET BY MOUTH EVERY DAY 90 tablet 3    magnesium 30 MG tablet Take 1 tablet (30 mg) by mouth daily      metoprolol succinate ER (TOPROL XL) 50 MG 24 hr tablet Take 1 tablet (50 mg) by mouth daily 90 tablet 3    multivit-min/folic/vit K/lycop (MEN'S MULTIVITAMIN ORAL) [MULTIVIT-MIN/FOLIC/VIT K/LYCOP (MEN'S MULTIVITAMIN ORAL)] Take 1 tablet by mouth daily.      pantoprazole (PROTONIX) 40 MG EC tablet TAKE 1 TABLET BY MOUTH ONE TIME DAILY 90 tablet 3    sildenafil (REVATIO) 20 MG tablet TAKE ONE TO THREE TABS ORALLY 30 MIN TO FOUR HOURS PRIOR TO INTERCOURSE 30 tablet 5    simvastatin (ZOCOR) 10 MG tablet TAKE 1 TABLET BY MOUTH EVERY DAY IN THE EVENING 90 tablet 3       Physical Exam:  /84 (BP Location: Left arm, Patient Position: Chair, Cuff Size: Adult Regular)   Pulse 70   Ht 1.765 m (5' 9.5\")   Wt 98.7 kg (217 lb 9.6 oz)   SpO2 95%   BMI 31.67 kg/m    Gen: alert, oriented, no distress  HEENT: nasal mucosa is unremarkable, no oropharyngeal lesions, no cervical or supraclavicular lymphadenopathy  CV: RRR, no M/G/R  Resp: CTAB, no focal crackles or wheezes  Skin: no apparent rashes  Ext: no cyanosis, clubbing or edema  Neuro: alert, nonfocal    Labs:  reviewed    Imaging:  Chest CT (June 2023):  - images directly reviewed, formal interpretation follows:  FINDINGS:   LUNGS AND PLEURA: Benign 5 mm calcified granuloma left upper lobe. Three 2 mm left upper lobe nodules are nonspecific but probably represent noncalcified benign granulomas (series 3 image 106, 122 and 145). Lungs are otherwise clear. No pleural effusion.     MEDIASTINUM/AXILLAE: Benign 2.3 cm and three 0.3 cm calcified left hilar lymph nodes. No lymphadenopathy. Heart size normal. No pericardial effusion. Normal caliber thoracic aorta and central pulmonary arteries.     CORONARY ARTERY CALCIFICATION: None.     UPPER ABDOMEN: No significant finding.     MUSCULOSKELETAL: Unremarkable.                                                 "                      IMPRESSION:   1.  Benign calcified left hilar lymph node and benign calcified granuloma in the left upper lobe account for recent radiographic findings and require no further follow-up.  2.  Three 2 mm left upper lobe nodules are nonspecific but probably represent noncalcified benign granulomas. Please refer to management guidelines below.     REFERENCE:  Guidelines for Management of Incidental Pulmonary Nodules Detected on CT Images: From the Fleischner Society 2017.   Guidelines apply to incidental nodules in patients who are 35 years or older.  Guidelines do not apply to lung cancer screening, patients with immunosuppression, or patients with known primary cancer.     MULTIPLE NODULES  Nodule size <6 mm  Low-risk patients: No follow-up needed.  High-risk patients: Optional follow-up at 12 months.    TTE (November 2015):   Summary   1. Normal left ventricular size and systolic performance. The ejection   fraction is estimated to be 60%.   2. No significant valvular heart disease.    Pulmonary Function Testing  August 2023  FVC 6.62 (141%)  FEV1 4.79 (126%)  FEV1/FVC 0.72  No bronchodilator response  RV 2.42 (147%)  TLC 9.14 (129%)  DLCOc 124%    Time spent on chart and image review, meeting with the patient to obtain history, perform physical exam, discuss test results, diagnostic possibilities, further testing options, treatment plan options, and care coordination: 65 minutes

## 2023-08-09 NOTE — PATIENT INSTRUCTIONS
It was good to meet you in clinic today. This is what we discussed:    Your lung function is normal currently.  You had severe reactive airways dysfunction syndrome (RADS)   We need to get another test to evaluate for irritant-induced asthma called a methacholine challenge test.  I will contact you with results.  In the meantime, stay active with exercise.  Use the albuterol as needed.  Contact me with questions or concerns.    Cayden Orellana MD  Pulmonary and Critical Care Medicine  [facility]  Office [phone]

## 2023-08-11 LAB
DLCOCOR-%PRED-PRE: 124 %
DLCOCOR-PRE: 37.6 ML/MIN/MMHG
DLCOUNC-%PRED-PRE: 120 %
DLCOUNC-PRE: 36.38 ML/MIN/MMHG
DLCOUNC-PRED: 30.17 ML/MIN/MMHG
ERV-%PRED-PRE: 77 %
ERV-PRE: 1.18 L
ERV-PRED: 1.51 L
EXPTIME-PRE: 10.29 SEC
FEF2575-%PRED-POST: 120 %
FEF2575-%PRED-PRE: 97 %
FEF2575-POST: 4.49 L/SEC
FEF2575-PRE: 3.6 L/SEC
FEF2575-PRED: 3.71 L/SEC
FEFMAX-%PRED-PRE: 90 %
FEFMAX-PRE: 9 L/SEC
FEFMAX-PRED: 9.98 L/SEC
FEV1-%PRED-PRE: 126 %
FEV1-PRE: 4.79 L
FEV1FEV6-PRE: 74 %
FEV1FEV6-PRED: 81 %
FEV1FVC-PRE: 72 %
FEV1FVC-PRED: 81 %
FEV1SVC-PRE: 71 %
FEV1SVC-PRED: 70 %
FIFMAX-PRE: 6.41 L/SEC
FRCPLETH-%PRED-PRE: 114 %
FRCPLETH-PRE: 3.76 L
FRCPLETH-PRED: 3.29 L
FVC-%PRED-PRE: 141 %
FVC-PRE: 6.62 L
FVC-PRED: 4.69 L
IC-%PRED-PRE: 141 %
IC-PRE: 5.38 L
IC-PRED: 3.81 L
RVPLETH-%PRED-PRE: 147 %
RVPLETH-PRE: 2.42 L
RVPLETH-PRED: 1.64 L
TLCPLETH-%PRED-PRE: 129 %
TLCPLETH-PRE: 9.14 L
TLCPLETH-PRED: 7.07 L
VA-%PRED-PRE: 127 %
VA-PRE: 8.36 L
VC-%PRED-PRE: 123 %
VC-PRE: 6.72 L
VC-PRED: 5.43 L

## 2023-08-22 RX ORDER — ALBUTEROL SULFATE 0.83 MG/ML
2.5 SOLUTION RESPIRATORY (INHALATION) ONCE
Status: COMPLETED | OUTPATIENT
Start: 2023-08-23 | End: 2023-08-23

## 2023-08-23 ENCOUNTER — HOSPITAL ENCOUNTER (OUTPATIENT)
Dept: RESPIRATORY THERAPY | Facility: CLINIC | Age: 43
Discharge: HOME | End: 2023-08-23
Admitting: INTERNAL MEDICINE
Payer: OTHER MISCELLANEOUS

## 2023-08-23 VITALS — DIASTOLIC BLOOD PRESSURE: 80 MMHG | SYSTOLIC BLOOD PRESSURE: 126 MMHG | OXYGEN SATURATION: 96 %

## 2023-08-23 DIAGNOSIS — T59.91XD: ICD-10-CM

## 2023-08-23 PROCEDURE — 250N000009 HC RX 250: Performed by: INTERNAL MEDICINE

## 2023-08-23 PROCEDURE — 94070 EVALUATION OF WHEEZING: CPT

## 2023-08-23 PROCEDURE — 999N000157 HC STATISTIC RCP TIME EA 10 MIN

## 2023-08-23 PROCEDURE — 94070 EVALUATION OF WHEEZING: CPT | Mod: 26 | Performed by: INTERNAL MEDICINE

## 2023-08-23 PROCEDURE — 95070 INHLJ BRNCL CHALLENGE TSTG: CPT

## 2023-08-23 RX ADMIN — ALBUTEROL SULFATE 2.5 MG: 2.5 SOLUTION RESPIRATORY (INHALATION) at 14:11

## 2023-08-23 NOTE — PROGRESS NOTES
RESPIRATORY CARE NOTE    Methacholine challenge testing completed by patient. Good patient effort and cooperation. Albuterol 2.5 mg neb given for bronchodilation post methacholine. PT tolerated well after bronchodilation. Test will be scanned into Epic. The results of this test meet the ATS standards for acceptability and repeatability.  PT left in no distress.    Methacholine dosages administered:    0.0625mg  0.25mg  1mg  4mg  16mg    Carlee Blount, RT   8/23/2023

## 2023-08-24 ENCOUNTER — DOCUMENTATION ONLY (OUTPATIENT)
Dept: PULMONOLOGY | Facility: CLINIC | Age: 43
End: 2023-08-24
Payer: COMMERCIAL

## 2023-08-24 NOTE — LETTER
23    Dear Dr. Young,    See below for documentation regarding Elijah Blood ( 1980). Please feel free to call me at any time if needed.    Sincerely,    Cayden Orellana MD  Pulmonary and Critical Care Medicine  [facility]  Cell [phone]  Office [phone]  Pager [phone]  he/him    Pulmonary Documentation    Reviewed the methacholine challenge test data. The test is negative. Sent Elijah the following Petpace message:    Luis Nava,    I am writing to inform you of the result of the methacholine challenge test that you completed yesterday. The test is negative, meaning that you did not should excessively sensitive airways. This means that you likely do not have irritant-induced asthma from your prior chlorine exposure. You can use the albuterol as needed, but otherwise there is not other testing or treatment that you require. If you have questions or concerns, please do not hesitate to contact me.    Sincerely,  Sushil Orellana MD  Pulmonary and Critical Care Medicine  [facility]  Office [phone]

## 2023-08-24 NOTE — PROGRESS NOTES
Pulmonary Documentation    Reviewed the methacholine challenge test data. The test is negative. Sent Elijah the following General Compression message:    Luis Nava,    I am writing to inform you of the result of the methacholine challenge test that you completed yesterday. The test is negative, meaning that you did not should excessively sensitive airways. This means that you likely do not have irritant-induced asthma from your prior chlorine exposure. You can use the albuterol as needed, but otherwise there is not other testing or treatment that you require. If you have questions or concerns, please do not hesitate to contact me.    Sincerely,  Sushil Orellana MD  Pulmonary and Critical Care Medicine  [facility]  Office [phone]

## 2023-11-03 LAB
EXPTIME-%CHANGE-CHLG: -18 %
EXPTIME-CHLG: 8.94 SEC
EXPTIME-PRE: 10.94 SEC
FEF2575-%CHANGE-CHLG: -26 %
FEF2575-%PRED-CHLG: 64 %
FEF2575-%PRED-POST: 99 %
FEF2575-%PRED-PRE: 87 %
FEF2575-POST: 3.68 L/SEC
FEF2575-PRE: 3.24 L/SEC
FEF2575-PRED: 3.71 L/SEC
FEFMAX-%PRED-PRE: 96 %
FEFMAX-PRE: 9.59 L/SEC
FEFMAX-PRED: 9.98 L/SEC
FEV1-%PRED-PRE: 121 %
FEV1-PRE: 4.6 L
FEV1FEV6-PRE: 73 %
FEV1FEV6-PRED: 81 %
FEV1FVC-PRE: 70 %
FEV1FVC-PRED: 81 %
FIFMAX-%CHANGE-CHLG: 29 %
FIFMAX-CHLG: 12.06 L/SEC
FIFMAX-PRE: 9.32 L/SEC
FVC-%PRED-PRE: 139 %
FVC-PRE: 6.56 L
FVC-PRED: 4.69 L

## 2023-11-06 ENCOUNTER — OFFICE VISIT (OUTPATIENT)
Dept: FAMILY MEDICINE | Facility: CLINIC | Age: 43
End: 2023-11-06
Payer: OTHER MISCELLANEOUS

## 2023-11-06 VITALS
RESPIRATION RATE: 16 BRPM | SYSTOLIC BLOOD PRESSURE: 131 MMHG | HEIGHT: 70 IN | BODY MASS INDEX: 31.98 KG/M2 | OXYGEN SATURATION: 97 % | WEIGHT: 223.4 LBS | DIASTOLIC BLOOD PRESSURE: 76 MMHG | TEMPERATURE: 98.2 F | HEART RATE: 51 BPM

## 2023-11-06 DIAGNOSIS — Z57.9 OCCUPATIONAL EXPOSURE IN WORKPLACE: ICD-10-CM

## 2023-11-06 PROCEDURE — 99213 OFFICE O/P EST LOW 20 MIN: CPT | Performed by: FAMILY MEDICINE

## 2023-11-06 SDOH — HEALTH STABILITY - PHYSICAL HEALTH: OCCUPATIONAL EXPOSURE TO UNSPECIFIED RISK FACTOR: Z57.9

## 2023-11-06 NOTE — PROGRESS NOTES
"      Maurice Nava is a 42 year old, presenting for the following health issues:  Work Comp ( Follow up reactive airways dysfunction syndrome, exposure to chlorine and hydrochloric acid gas, possible irritant-induced asthma/)    Work comp  Inhalation exposure at work 5-12-23 (chlorine and acid gas mixture)  Elliptical and treadmill have been ok  Occas use of albuterol and it is helpful  Saw Dr Orellana in Pulmonary Aug 2023.  Methacholine challenge was negative.      11/6/2023     9:38 AM   Additional Questions   Roomed by Lindsay PACHECO LPN       History of Present Illness       Reason for visit:  Work comp  Symptom onset:  More than a month    He eats 2-3 servings of fruits and vegetables daily.He consumes 1 sweetened beverage(s) daily.He exercises with enough effort to increase his heart rate 30 to 60 minutes per day.  He exercises with enough effort to increase his heart rate 4 days per week. He is missing 7 dose(s) of medications per week.             Objective    /76   Pulse 51   Temp 98.2  F (36.8  C) (Oral)   Resp 16   Ht 1.765 m (5' 9.5\")   Wt 101.3 kg (223 lb 6.4 oz)   SpO2 97%   BMI 32.52 kg/m    Body mass index is 32.52 kg/m .  Physical Exam   GENERAL: healthy, alert and no distress  RESP: lungs clear to auscultation - no rales, rhonchi or wheezes  CV: regular rate and rhythm, normal S1 S2, no S3 or S4, no murmur, click or rub, no peripheral edema and peripheral pulses strong  MS: no gross musculoskeletal defects noted, no edema    Encounter Diagnosis   Name Primary?    Occupational exposure in workplace, symptoms for the most part resolved, with occasional need for albuterol use         PLAN:   Use albuterol as needed.    Follow up as needed               "

## 2023-11-06 NOTE — COMMUNITY RESOURCES LIST (ENGLISH)
11/06/2023   [facility]  N/A  For questions about this resource list or additional care needs, please contact your primary care clinic or care manager.  Phone: [phone]   Email: N/A   Address: [address] [place] [address]   Hours: N/A        Food and Nutrition       Food pantry  1  [facility] Food Shelf - Food Shelf Distance: 1.28 miles      Pickup   [address] [place] [address]  Language: English, Maltese  Hours: Mon - Tue 2:00 PM - 7:00 PM , Wed 11:00 AM - 2:00 PM , Fri 11:00 AM - 2:00 PM  Fees: Free   Phone: (164) 921-4396 Email: info@[employer].2threads Website: http://Azullo.org     2  [facility] - Food Shelf Distance: 2.37 miles      [facility]   [address] [place] [address]  Language: English  Hours: Mon - Wed 9:30 AM - 2:30 PM  Fees: Free   Phone: (779) 330-1742 Email: tasha@[email or web address] HospitalMedTel24.org Website: http://[facility].org/[facility]/[place]/     SNAP application assistance  3  [place] Department of Human Services - MNFood[place] (SNAP) Distance: 8.92 miles      Phone/Virtual   [address] [address] [place] [address]  Language: English, Hmong, Afghan, Sudanese, Maltese, Khmer  Hours: Mon - Fri 9:00 AM - 5:00 PM  Fees: Free   Phone: (752) 232-8665 Website: https://[place].gov/dhs/people-we-serve/adults/economic-assistance/food-nutrition/programs-and-services/supplemental-nutrition-assistance-program.jsp     4  [employer] [place] Distance: 9.02 miles      Phone/Virtual   [address] [address]  Language: English, Hmong, Afghan, Sudanese, Maltese  Hours: Mon - Fri 8:30 AM - 4:30 PM  Fees: Free   Phone: (571) 807-7289 Email: helpline@hungersolutions.org Website: https://www.hungersolutions.org/programs/[place]-food-helpline/     Soup kitchen or free meals  5  [facility] - [facility] - Thursday Night Community Meal Distance: 2.54 miles       In-Person   [address] [place] [address]  Language: English, Anguillan  Hours: Thu 6:00 PM - 7:00 PM  Fees: Free   Phone: (682) 865-8572 Email: center@saintandrews.org Website: https://www.saintandrews.org     6  [facility] - [place]aves and [place]ishes - Loaves and Fishes Distance: 5.53 miles      Pick   [address] [facility] [address]  Language: English  Hours: Wed 5:30 PM - 6:30 PM  Fees: Free   Phone: (627) 595-7490 Email: office@[employer].org Website: https://www.Shootitlivemn.org          Important Numbers & Websites       Emergency Services   911  [facility] Services   311  Poison Control   (964) 352-3687  Suicide Prevention Lifeline   (200) 269-7362 (TALK)  Child Abuse Hotline   (643) 818-5488 (4-A-Child)  Sexual Assault Hotline   (880) 223-3975 (HOPE)  National Runaway Safeline   (900) 399-8101 (RUNAWAY)  All-Options Talkline   (206) 539-8808  Substance Abuse Referral   (552) 436-3847 (HELP)

## 2023-11-06 NOTE — COMMUNITY RESOURCES LIST (ENGLISH)
11/06/2023   [facility] - Outpatient Clinics  N/A  For additional resource needs, please contact your health insurance member services or your primary care team.  Phone: [phone]   Email: N/A   Address: [address] [place] [address]   Hours: N/A        Food and Nutrition       Food pantry  1  [facility] Food Shelf - Food Shelf Distance: 1.28 miles      Pickup   [address] [place] [address]  Language: English, Kyrgyz  Hours: Mon - Tue 2:00 PM - 7:00 PM , Wed 11:00 AM - 2:00 PM , Fri 11:00 AM - 2:00 PM  Fees: Free   Phone: (313) 484-4951 Email: info@[employer].org Website: http://Iron.io.org     2  [facility] - Food Shelf Distance: 2.37 miles      Pickup   [address] [place] [address]  Language: English  Hours: Mon - Wed 9:30 AM - 2:30 PM  Fees: Free   Phone: (983) 639-9872 Email: tasha@[facility].org Website: http://[facility].org/[facility]/[place]/     SNAP application assistance  3  [employer] [place] Distance: 9.02 miles      Phone/Virtual   [address] [place] [address]  Language: English, Hmong, Togolese, Vietnamese, Kyrgyz  Hours: Mon - Fri 8:30 AM - 4:30 PM  Fees: Free   Phone: (769) 293-3811 Email: helpline@hungersolutions.org Website: https://www.hungersolutions.org/programs/[place]-food-helpline/     4  [place] Department of Human Services - MNFoodHelper (SNAP) Distance: 8.92 miles      Phone/Virtual   [address] [address] [place] [address]  Language: English, Hmong, Togolese, Vietnamese, Kyrgyz, Yakut  Hours: Mon - Fri 9:00 AM - 5:00 PM  Fees: Free   Phone: (804) 394-6149 Website: https://[place].gov/dhs/people-we-serve/adults/economic-assistance/food-nutrition/programs-and-services/supplemental-nutrition-assistance-program.jsp     Soup kitchen or free meals  5  [facility] - Thursday Night Community Meal Distance: 2.54 miles       In-Person   [address] [place] [address]  Language: English, Sami  Hours: Thu 6:00 PM - 7:00 PM  Fees: Free   Phone: (643) 860-1356 Email: center@saintandrews.org Website: https://www.saintandrews.org     6  Our [facility] - [place]aves and Fishes - Loaves and Fishes Distance: 5.53 miles      Pick   [address] [facility] [place] [address]  Language: English  Hours: Wed 5:30 PM - 6:30 PM  Fees: Free   Phone: (673) 591-1090 Email: office@[facility].org Website: https://www.[employer].org          Important Numbers & Websites       [facility]   211 211itedway.org  Poison Control   (500) 445-9120 [place]  Suicide and Crisis Lifeline   [address] [place], [place] [address]line.org  Childhelp [place] Child Abuse Hotline   621.274.3827 Childhelphotline.org  [place] Sexual Assault Hotline   (511) 656-3456 (HOPE) Rainn.org  National Runaway Safeline   (427) 484-6350 (RUNAWAY) [facility] Clinics[facility].org  Pregnancy & Postpartum Support [place]   Call/text 656-049-4337 Ppsupportmn.org  Substance Abuse National Helpline ([facility]   121-998-HELP (0495) Findtreatment.gov  Emergency Services   911

## 2024-03-12 DIAGNOSIS — N52.9 ERECTILE DYSFUNCTION, UNSPECIFIED ERECTILE DYSFUNCTION TYPE: ICD-10-CM

## 2024-03-12 DIAGNOSIS — I10 PRIMARY HYPERTENSION: ICD-10-CM

## 2024-03-12 DIAGNOSIS — K21.9 GERD (GASTROESOPHAGEAL REFLUX DISEASE): ICD-10-CM

## 2024-03-12 DIAGNOSIS — E78.5 HYPERLIPIDEMIA, UNSPECIFIED HYPERLIPIDEMIA TYPE: ICD-10-CM

## 2024-03-12 RX ORDER — LISINOPRIL 20 MG/1
TABLET ORAL
Qty: 90 TABLET | Refills: 3 | OUTPATIENT
Start: 2024-03-12

## 2024-03-12 RX ORDER — SILDENAFIL CITRATE 20 MG/1
TABLET ORAL
Qty: 30 TABLET | Refills: 5 | OUTPATIENT
Start: 2024-03-12

## 2024-03-12 RX ORDER — LISINOPRIL 20 MG/1
20 TABLET ORAL DAILY
Qty: 90 TABLET | Refills: 0 | Status: SHIPPED | OUTPATIENT
Start: 2024-03-12 | End: 2024-05-01

## 2024-03-12 RX ORDER — SIMVASTATIN 10 MG
10 TABLET ORAL EVERY EVENING
Qty: 90 TABLET | Refills: 0 | Status: SHIPPED | OUTPATIENT
Start: 2024-03-12 | End: 2024-05-01

## 2024-03-12 RX ORDER — SIMVASTATIN 10 MG
TABLET ORAL
Qty: 90 TABLET | Refills: 3 | OUTPATIENT
Start: 2024-03-12

## 2024-03-12 RX ORDER — PANTOPRAZOLE SODIUM 40 MG/1
40 TABLET, DELAYED RELEASE ORAL DAILY
Qty: 90 TABLET | Refills: 0 | Status: SHIPPED | OUTPATIENT
Start: 2024-03-12 | End: 2024-05-01

## 2024-03-12 RX ORDER — SILDENAFIL CITRATE 20 MG/1
TABLET ORAL
Qty: 30 TABLET | Refills: 5 | Status: SHIPPED | OUTPATIENT
Start: 2024-03-12 | End: 2024-05-01

## 2024-03-12 RX ORDER — METOPROLOL SUCCINATE 50 MG/1
50 TABLET, EXTENDED RELEASE ORAL DAILY
Qty: 90 TABLET | Refills: 3 | OUTPATIENT
Start: 2024-03-12

## 2024-03-12 RX ORDER — METOPROLOL SUCCINATE 50 MG/1
50 TABLET, EXTENDED RELEASE ORAL DAILY
Qty: 90 TABLET | Refills: 0 | Status: SHIPPED | OUTPATIENT
Start: 2024-03-12 | End: 2024-05-01

## 2024-03-12 RX ORDER — PANTOPRAZOLE SODIUM 40 MG/1
TABLET, DELAYED RELEASE ORAL
Qty: 90 TABLET | Refills: 3 | OUTPATIENT
Start: 2024-03-12

## 2024-03-12 NOTE — TELEPHONE ENCOUNTER
I can't sign these in this form - can you re-que them up?  And let pt know he needs to establish with new provider

## 2024-03-12 NOTE — TELEPHONE ENCOUNTER
Called patient and assisted with setting up an establish care/physical appointment with [place] in May.  Pended 90-day supplies of his medications to requested pharmacy.

## 2024-03-19 ENCOUNTER — DOCUMENTATION ONLY (OUTPATIENT)
Dept: PULMONOLOGY | Facility: CLINIC | Age: 44
End: 2024-03-19
Payer: COMMERCIAL

## 2024-05-01 ENCOUNTER — OFFICE VISIT (OUTPATIENT)
Dept: FAMILY MEDICINE | Facility: CLINIC | Age: 44
End: 2024-05-01
Payer: COMMERCIAL

## 2024-05-01 VITALS
DIASTOLIC BLOOD PRESSURE: 74 MMHG | HEART RATE: 61 BPM | OXYGEN SATURATION: 99 % | HEIGHT: 70 IN | TEMPERATURE: 98.2 F | WEIGHT: 220.8 LBS | BODY MASS INDEX: 31.61 KG/M2 | SYSTOLIC BLOOD PRESSURE: 130 MMHG | RESPIRATION RATE: 18 BRPM

## 2024-05-01 DIAGNOSIS — L30.9 ECZEMA, UNSPECIFIED TYPE: ICD-10-CM

## 2024-05-01 DIAGNOSIS — J70.8 CHLORINE INHALATION LUNG INJURY (H): ICD-10-CM

## 2024-05-01 DIAGNOSIS — Z00.00 ROUTINE GENERAL MEDICAL EXAMINATION AT A HEALTH CARE FACILITY: Primary | ICD-10-CM

## 2024-05-01 DIAGNOSIS — T59.4X1A CHLORINE INHALATION LUNG INJURY (H): ICD-10-CM

## 2024-05-01 DIAGNOSIS — E78.5 HYPERLIPIDEMIA, UNSPECIFIED HYPERLIPIDEMIA TYPE: ICD-10-CM

## 2024-05-01 DIAGNOSIS — I10 PRIMARY HYPERTENSION: ICD-10-CM

## 2024-05-01 DIAGNOSIS — J68.3: ICD-10-CM

## 2024-05-01 DIAGNOSIS — K21.9 GASTROESOPHAGEAL REFLUX DISEASE WITHOUT ESOPHAGITIS: ICD-10-CM

## 2024-05-01 DIAGNOSIS — L82.1 SEBORRHEIC KERATOSIS: ICD-10-CM

## 2024-05-01 DIAGNOSIS — E66.9 OBESITY WITH SERIOUS COMORBIDITY, UNSPECIFIED CLASSIFICATION, UNSPECIFIED OBESITY TYPE: ICD-10-CM

## 2024-05-01 DIAGNOSIS — N52.9 ERECTILE DYSFUNCTION, UNSPECIFIED ERECTILE DYSFUNCTION TYPE: ICD-10-CM

## 2024-05-01 PROBLEM — L73.9 DISEASE OF SEBACEOUS GLANDS: Status: RESOLVED | Noted: 2024-05-01 | Resolved: 2024-05-01

## 2024-05-01 PROBLEM — G44.019 EPISODIC CLUSTER HEADACHE: Status: ACTIVE | Noted: 2024-05-01

## 2024-05-01 PROBLEM — L73.9 DISEASE OF SEBACEOUS GLANDS: Status: ACTIVE | Noted: 2024-05-01

## 2024-05-01 PROBLEM — Z77.098 CHEMICAL EXPOSURE: Status: ACTIVE | Noted: 2023-05-12

## 2024-05-01 LAB
ERYTHROCYTE [DISTWIDTH] IN BLOOD BY AUTOMATED COUNT: 12.6 % (ref 10–15)
HBA1C MFR BLD: 5.3 % (ref 0–5.6)
HCT VFR BLD AUTO: 46.2 % (ref 40–53)
HGB BLD-MCNC: 15.7 G/DL (ref 13.3–17.7)
MCH RBC QN AUTO: 27.6 PG (ref 26.5–33)
MCHC RBC AUTO-ENTMCNC: 34 G/DL (ref 31.5–36.5)
MCV RBC AUTO: 81 FL (ref 78–100)
PLATELET # BLD AUTO: 272 10E3/UL (ref 150–450)
RBC # BLD AUTO: 5.68 10E6/UL (ref 4.4–5.9)
WBC # BLD AUTO: 10.9 10E3/UL (ref 4–11)

## 2024-05-01 PROCEDURE — 36415 COLL VENOUS BLD VENIPUNCTURE: CPT | Performed by: PHYSICIAN ASSISTANT

## 2024-05-01 PROCEDURE — 99396 PREV VISIT EST AGE 40-64: CPT | Performed by: PHYSICIAN ASSISTANT

## 2024-05-01 PROCEDURE — 80061 LIPID PANEL: CPT | Performed by: PHYSICIAN ASSISTANT

## 2024-05-01 PROCEDURE — 83036 HEMOGLOBIN GLYCOSYLATED A1C: CPT | Performed by: PHYSICIAN ASSISTANT

## 2024-05-01 PROCEDURE — 84443 ASSAY THYROID STIM HORMONE: CPT | Performed by: PHYSICIAN ASSISTANT

## 2024-05-01 PROCEDURE — 85027 COMPLETE CBC AUTOMATED: CPT | Performed by: PHYSICIAN ASSISTANT

## 2024-05-01 PROCEDURE — 80053 COMPREHEN METABOLIC PANEL: CPT | Performed by: PHYSICIAN ASSISTANT

## 2024-05-01 PROCEDURE — 99214 OFFICE O/P EST MOD 30 MIN: CPT | Mod: 25 | Performed by: PHYSICIAN ASSISTANT

## 2024-05-01 RX ORDER — LISINOPRIL 20 MG/1
20 TABLET ORAL DAILY
Qty: 90 TABLET | Refills: 3 | Status: SHIPPED | OUTPATIENT
Start: 2024-05-01

## 2024-05-01 RX ORDER — METOPROLOL SUCCINATE 50 MG/1
50 TABLET, EXTENDED RELEASE ORAL DAILY
Qty: 90 TABLET | Refills: 3 | Status: SHIPPED | OUTPATIENT
Start: 2024-05-01

## 2024-05-01 RX ORDER — TRIAMCINOLONE ACETONIDE 1 MG/G
CREAM TOPICAL 2 TIMES DAILY
Qty: 80 G | Refills: 1 | Status: SHIPPED | OUTPATIENT
Start: 2024-05-01

## 2024-05-01 RX ORDER — PANTOPRAZOLE SODIUM 40 MG/1
40 TABLET, DELAYED RELEASE ORAL DAILY
Qty: 90 TABLET | Refills: 3 | Status: SHIPPED | OUTPATIENT
Start: 2024-05-01

## 2024-05-01 RX ORDER — SIMVASTATIN 10 MG
10 TABLET ORAL EVERY EVENING
Qty: 90 TABLET | Refills: 3 | Status: SHIPPED | OUTPATIENT
Start: 2024-05-01

## 2024-05-01 RX ORDER — SILDENAFIL CITRATE 20 MG/1
TABLET ORAL
Qty: 30 TABLET | Refills: 5 | Status: SHIPPED | OUTPATIENT
Start: 2024-05-01

## 2024-05-01 SDOH — HEALTH STABILITY: PHYSICAL HEALTH: ON AVERAGE, HOW MANY MINUTES DO YOU ENGAGE IN EXERCISE AT THIS LEVEL?: 60 MIN

## 2024-05-01 SDOH — HEALTH STABILITY: PHYSICAL HEALTH: ON AVERAGE, HOW MANY DAYS PER WEEK DO YOU ENGAGE IN MODERATE TO STRENUOUS EXERCISE (LIKE A BRISK WALK)?: 7 DAYS

## 2024-05-01 ASSESSMENT — SOCIAL DETERMINANTS OF HEALTH (SDOH): HOW OFTEN DO YOU GET TOGETHER WITH FRIENDS OR RELATIVES?: ONCE A WEEK

## 2024-05-01 NOTE — PROGRESS NOTES
Preventive Care Visit  [facility]  Indiana Lawson PA-C, Physician Assistant - Medical  May 1, 2024      Assessment & Plan     Routine general medical examination at a health care facility  Labs updated today.  Vaccines up to date.  - PRIMARY CARE FOLLOW-UP SCHEDULING  - CBC with platelets  - Comprehensive metabolic panel  - Hemoglobin A1c  - Lipid panel reflex to direct LDL Fasting  - TSH with free T4 reflex  - CBC with platelets  - Comprehensive metabolic panel  - Hemoglobin A1c  - Lipid panel reflex to direct LDL Fasting  - TSH with free T4 reflex    Hyperlipidemia, unspecified hyperlipidemia type  Chronic issue, recheck lipid panel today.  Refilled Zocor for 1 year supply.  - simvastatin (ZOCOR) 10 MG tablet  Dispense: 90 tablet; Refill: 3  - Lipid panel reflex to direct LDL Fasting  - Lipid panel reflex to direct LDL Fasting    Erectile dysfunction, unspecified erectile dysfunction type  Chronic issue, stable, meds refilled.  - sildenafil (REVATIO) 20 MG tablet  Dispense: 30 tablet; Refill: 5    Gastroesophageal reflux disease without esophagitis  Chronic issue, stable, meds refilled.  - pantoprazole (PROTONIX) 40 MG EC tablet  Dispense: 90 tablet; Refill: 3    Primary hypertension  Chronic issue, stable, meds refilled.  - metoprolol succinate ER (TOPROL XL) 50 MG 24 hr tablet  Dispense: 90 tablet; Refill: 3  - lisinopril (ZESTRIL) 20 MG tablet  Dispense: 90 tablet; Refill: 3  - Comprehensive metabolic panel  - Comprehensive metabolic panel    Obesity with serious comorbidity, unspecified classification, unspecified obesity type  Chronic issue, BMI up to 31, recommend lifestyle changes to improve weight.  Will check A1C today.  - Hemoglobin A1c  - Hemoglobin A1c    Other acute and subacute respiratory conditions due to chemicals, gases, fumes and vapors (H)  Chlorine inhalation lung injury (H24)  Followed by pulmonary.    Seborrheic keratosis  Reassured skin concerns were  "benign in etiology.    Eczema, unspecified type  New problem, has red itchy patch on back, recommend trial of steroid cream.  Instructed to use for up to 2 weeks, then stop use, as chronic steroids can thin and discolor the skin.  - triamcinolone (KENALOG) 0.1 % external cream  Dispense: 80 g; Refill: 1      Patient has been advised of split billing requirements and indicates understanding: Yes          BMI  Estimated body mass index is 31.97 kg/m  as calculated from the following:    Height as of this encounter: 1.77 m (5' 9.69\").    Weight as of this encounter: 100.2 kg (220 lb 12.8 oz).   Weight management plan: Discussed healthy diet and exercise guidelines    Counseling  Appropriate preventive services were discussed with this patient, including applicable screening as appropriate for fall prevention, nutrition, physical activity, Tobacco-use cessation, weight loss and cognition.  Checklist reviewing preventive services available has been given to the patient.  Reviewed patient's diet, addressing concerns and/or questions.   He is at risk for psychosocial distress and has been provided with information to reduce risk.       Risks, benefits and alternatives were discussed with patient. Agreeable to the plan of care.      Subjective   Elijah is a 43 year old, presenting for the following:  Physical (Fastings labs, no concerns) and Establish Care        5/1/2024     7:02 AM   Additional Questions   Roomed by Christiane IRAHETA CMA        Health Care Directive  Patient does not have a Health Care Directive or Living Will: Discussed advance care planning with patient; however, patient declined at this time.    HPI      5/1/2024   General Health   How would you rate your overall physical health? Good   Feel stress (tense, anxious, or unable to sleep) Only a little   (!) STRESS CONCERN      5/1/2024   Nutrition   Three or more servings of calcium each day? Yes   Diet: Other   If other, please elaborate: just eating good "   How many servings of fruit and vegetables per day? (!) 2-3   How many sweetened beverages each day? 0-1         5/1/2024   Exercise   Days per week of moderate/strenous exercise 7 days   Average minutes spent exercising at this level 60 min         5/1/2024   Social Factors   Frequency of gathering with friends or relatives Once a week   Worry food won't last until get money to buy more No   Food not last or not have enough money for food? No   Do you have housing?  Yes   Are you worried about losing your housing? No   Lack of transportation? No   Unable to get utilities (heat,electricity)? No         5/1/2024   Dental   Dentist two times every year? Yes         5/1/2024   TB Screening   Were you born outside of the [place]? No         Today's PHQ-2 Score:       5/1/2024     6:59 AM   PHQ-2 ( 1999 [employer])   Q1: Little interest or pleasure in doing things 0   Q2: Feeling down, depressed or hopeless 0   PHQ-2 Score 0   Q1: Little interest or pleasure in doing things Not at all   Q2: Feeling down, depressed or hopeless Not at all   PHQ-2 Score 0           5/1/2024   Substance Use   Alcohol more than 3/day or more than 7/wk No   Do you use any other substances recreationally? No     Social History     Tobacco Use    Smoking status: Never     Passive exposure: Never    Smokeless tobacco: Never   Vaping Use    Vaping status: Never Used   Substance Use Topics    Alcohol use: Yes     Comment: Alcoholic Drinks/day: occasional use    Drug use: No             5/1/2024   One time HIV Screening   Previous HIV test? No         5/1/2024   STI Screening   New sexual partner(s) since last STI/HIV test? No   ASCVD Risk   The 10-year ASCVD risk score (Shraddha SAWANT, et al., 2019) is: 2.2%    Values used to calculate the score:      Age: 43 years      Sex: Male      Is Non- : No      Diabetic: No      Tobacco smoker: No      Systolic Blood Pressure: 130 mmHg      Is BP treated: Yes      HDL Cholesterol: 41  mg/dL      Total Cholesterol: 173 mg/dL        5/1/2024   Contraception/Family Planning   Questions about contraception or family planning No        Reviewed and updated as needed this visit by Provider                    Patient Active Problem List   Diagnosis    Hypertension    Episodic cluster headache    GERD (gastroesophageal reflux disease)    Hyperlipidemia    Obesity with serious comorbidity, unspecified classification, unspecified obesity type    Chlorine inhalation lung injury (H24)    Chemical exposure    Other acute and subacute respiratory conditions due to chemicals, gases, fumes and vapors (H)     Past Surgical History:   Procedure Laterality Date    HC EXCISION SUBMAXILLARY GLAND      Description: Surgery Excision Of Submandibular (Submaxillary) Gland;  Recorded: 02/09/2010;  Comments: stones       Social History     Tobacco Use    Smoking status: Never     Passive exposure: Never    Smokeless tobacco: Never   Substance Use Topics    Alcohol use: Yes     Comment: Alcoholic Drinks/day: occasional use     Family History   Problem Relation Age of Onset    Hypertension Other     Hyperlipidemia Other     Coronary Artery Disease No family hx of          Current Outpatient Medications   Medication Sig Dispense Refill    albuterol (PROAIR HFA/PROVENTIL HFA/VENTOLIN HFA) 108 (90 Base) MCG/ACT inhaler Inhale 1-2 puffs into the lungs every 4 hours as needed for shortness of breath, wheezing or cough 18 g 11    aspirin 81 MG EC tablet [ASPIRIN 81 MG EC TABLET] Take 1 tablet (81 mg total) by mouth daily.      lisinopril (ZESTRIL) 20 MG tablet Take 1 tablet (20 mg) by mouth daily 90 tablet 0    magnesium 30 MG tablet Take 1 tablet (30 mg) by mouth daily      metoprolol succinate ER (TOPROL XL) 50 MG 24 hr tablet Take 1 tablet (50 mg) by mouth daily 90 tablet 0    multivit-min/folic/vit K/lycop (MEN'S MULTIVITAMIN ORAL) [MULTIVIT-MIN/FOLIC/VIT K/LYCOP (MEN'S MULTIVITAMIN ORAL)] Take 1 tablet by mouth daily.       "pantoprazole (PROTONIX) 40 MG EC tablet Take 1 tablet (40 mg) by mouth daily 90 tablet 0    sildenafil (REVATIO) 20 MG tablet Take one to three tabs orally 30 minutes to four hours prior to intercourse 30 tablet 5    simvastatin (ZOCOR) 10 MG tablet Take 1 tablet (10 mg) by mouth every evening 90 tablet 0     Allergies   Allergen Reactions    Azithromycin Itching and Rash     Rash on neck, behind ears after each dose of azithromycin.           Review of Systems  CONSTITUTIONAL: NEGATIVE for fever, chills, change in weight  INTEGUMENTARY/SKIN: NEGATIVE for worrisome rashes, moles or lesions  EYES: NEGATIVE for vision changes or irritation  ENT/MOUTH: NEGATIVE for ear, mouth and throat problems  RESP: NEGATIVE for significant cough or SOB  CV: NEGATIVE for chest pain, palpitations or peripheral edema  GI: NEGATIVE for nausea, abdominal pain, heartburn, or change in bowel habits  : NEGATIVE for frequency, dysuria, or hematuria  MUSCULOSKELETAL: NEGATIVE for significant arthralgias or myalgia  NEURO: NEGATIVE for weakness, dizziness or paresthesias  ENDOCRINE: NEGATIVE for temperature intolerance, skin/hair changes  HEME: NEGATIVE for bleeding problems  PSYCHIATRIC: NEGATIVE for changes in mood or affect     Objective    Exam  /74 (BP Location: Left arm, Patient Position: Sitting, Cuff Size: Adult Large)   Pulse 61   Temp 98.2  F (36.8  C) (Oral)   Resp 18   Ht 1.765 m (5' 9.5\")   Wt 100.2 kg (220 lb 12.8 oz)   SpO2 99%   BMI 32.14 kg/m     Estimated body mass index is 32.14 kg/m  as calculated from the following:    Height as of this encounter: 1.765 m (5' 9.5\").    Weight as of this encounter: 100.2 kg (220 lb 12.8 oz).    Physical Exam  GENERAL: alert and no distress  EYES: Eyes grossly normal to inspection, PERRL and conjunctivae and sclerae normal  HENT: ear canals and TM's normal, nose and mouth without ulcers or lesions  NECK: no adenopathy, no asymmetry, masses, or scars  RESP: lungs clear to " auscultation - no rales, rhonchi or wheezes  CV: regular rate and rhythm, normal S1 S2, no S3 or S4, no murmur, click or rub, no peripheral edema  ABDOMEN: soft, nontender, no hepatosplenomegaly, no masses and bowel sounds normal  MS: no gross musculoskeletal defects noted, no edema  SKIN: 2 small SK on top of scalp, large red patch of skin midline spine  NEURO: Normal strength and tone, mentation intact and speech normal  PSYCH: mentation appears normal, affect normal/bright        Signed Electronically by: Indiana Lawson PA-C

## 2024-05-01 NOTE — PATIENT INSTRUCTIONS
Preventive Care Advice   This is general advice given by our system to help you stay healthy. However, your care team may have specific advice just for you. Please talk to your care team about your preventive care needs.  Nutrition  Eat 5 or more servings of fruits and vegetables each day.  Try wheat bread, brown rice and whole grain pasta (instead of white bread, rice, and pasta).  Get enough calcium and vitamin D. Check the label on foods and aim for 100% of the RDA (recommended daily allowance).  Lifestyle  Exercise at least 150 minutes each week   (30 minutes a day, 5 days a week).  Do muscle strengthening activities 2 days a week. These help control your weight and prevent disease.  No smoking.  Wear sunscreen to prevent skin cancer.  Have a dental exam and cleaning every 6 months.  Yearly exams  See your health care team every year to talk about:  Any changes in your health.  Any medicines your care team has prescribed.  Preventive care, family planning, and ways to prevent chronic diseases.  Shots (vaccines)   HPV shots (up to age 26), if you've never had them before.  Hepatitis B shots (up to age 59), if you've never had them before.  COVID-19 shot: Get this shot when it's due.  Flu shot: Get a flu shot every year.  Tetanus shot: Get a tetanus shot every 10 years.  Pneumococcal, hepatitis A, and RSV shots: Ask your care team if you need these based on your risk.  Shingles shot (for age 50 and up).  General health tests  Diabetes screening:  Starting at age 35, Get screened for diabetes at least every 3 years.  If you are younger than age 35, ask your care team if you should be screened for diabetes.  Cholesterol test: At age 39, start having a cholesterol test every 5 years, or more often if advised.  Bone density scan (DEXA): At age 50, ask your care team if you should have this scan for osteoporosis (brittle bones).  Hepatitis C: Get tested at least once in your life.  STIs (sexually transmitted  infections)  Before age 24: Ask your care team if you should be screened for STIs.  After age 24: Get screened for STIs if you're at risk. You are at risk for STIs (including HIV) if:  You are sexually active with more than one person.  You don't use condoms every time.  You or a partner was diagnosed with a sexually transmitted infection.  If you are at risk for HIV, ask about PrEP medicine to prevent HIV.  Get tested for HIV at least once in your life, whether you are at risk for HIV or not.  Cancer screening tests  Cervical cancer screening: If you have a cervix, begin getting regular cervical cancer screening tests at age 21. Most people who have regular screenings with normal results can stop after age 65. Talk about this with your provider.  Breast cancer scan (mammogram): If you've ever had breasts, begin having regular mammograms starting at age 40. This is a scan to check for breast cancer.  Colon cancer screening: It is important to start screening for colon cancer at age 45.  Have a colonoscopy test every 10 years (or more often if you're at risk) Or, ask your provider about stool tests like a FIT test every year or Cologuard test every 3 years.  To learn more about your testing options, visit: https://www.[employer]/280176.pdf.  For help making a decision, visit: https://bit.ly/sw46409.  Prostate cancer screening test: If you have a prostate and are age 55 to 69, ask your provider if you would benefit from a yearly prostate cancer screening test.  Lung cancer screening: If you are a current or former smoker age 50 to 80, ask your care team if ongoing lung cancer screenings are right for you.  For informational purposes only. Not to replace the advice of your health care provider. Copyright   2023 [employer]. All rights reserved. Clinically reviewed by the [facility]. [employer] 288570 - REV 01/24.    Learning About Stress  What is stress?     Stress is your  body's response to a hard situation. Your body can have a physical, emotional, or mental response. Stress is a fact of life for most people, and it affects everyone differently. What causes stress for you may not be stressful for someone else.  A lot of things can cause stress. You may feel stress when you go on a job interview, take a test, or run a race. This kind of short-term stress is normal and even useful. It can help you if you need to work hard or react quickly. For example, stress can help you finish an important job on time.  Long-term stress is caused by ongoing stressful situations or events. Examples of long-term stress include long-term health problems, ongoing problems at work, or conflicts in your family. Long-term stress can harm your health.  How does stress affect your health?  When you are stressed, your body responds as though you are in danger. It makes hormones that speed up your heart, make you breathe faster, and give you a burst of energy. This is called the fight-or-flight stress response. If the stress is over quickly, your body goes back to normal and no harm is done.  But if stress happens too often or lasts too long, it can have bad effects. Long-term stress can make you more likely to get sick, and it can make symptoms of some diseases worse. If you tense up when you are stressed, you may develop neck, shoulder, or low back pain. Stress is linked to high blood pressure and heart disease.  Stress also harms your emotional health. It can make you martines, tense, or depressed. Your relationships may suffer, and you may not do well at work or school.  What can you do to manage stress?  You can try these things to help manage stress:   Do something active. Exercise or activity can help reduce stress. Walking is a great way to get started. Even everyday activities such as housecleaning or yard work can help.  Try yoga or devin chi. These techniques combine exercise and meditation. You may need  some training at first to learn them.  Do something you enjoy. For example, listen to music or go to a movie. Practice your hobby or do volunteer work.  Meditate. This can help you relax, because you are not worrying about what happened before or what may happen in the future.  Do guided imagery. Imagine yourself in any setting that helps you feel calm. You can use online videos, books, or a teacher to guide you.  Do breathing exercises. For example:  From a standing position, bend forward from the waist with your knees slightly bent. Let your arms dangle close to the floor.  Breathe in slowly and deeply as you return to a standing position. Roll up slowly and lift your head last.  Hold your breath for just a few seconds in the standing position.  Breathe out slowly and bend forward from the waist.  Let your feelings out. Talk, laugh, cry, and express anger when you need to. Talking with supportive friends or family, a counselor, or a kelly leader about your feelings is a healthy way to relieve stress. Avoid discussing your feelings with people who make you feel worse.  Write. It may help to write about things that are bothering you. This helps you find out how much stress you feel and what is causing it. When you know this, you can find better ways to cope.  What can you do to prevent stress?  You might try some of these things to help prevent stress:  Manage your time. This helps you find time to do the things you want and need to do.  Get enough sleep. Your body recovers from the stresses of the day while you are sleeping.  Get support. Your family, friends, and community can make a difference in how you experience stress.  Limit your news feed. Avoid or limit time on social media or news that may make you feel stressed.  Do something active. Exercise or activity can help reduce stress. Walking is a great way to get started.  Where can you learn more?  Go to https://www.healthwise.net/patiented  Enter N032 in the  "search box to learn more about \"Learning About Stress.\"  Current as of: October 24, 2023               Content Version: 14.0    8478-2267 [employer].   Care instructions adapted under license by your healthcare professional. If you have questions about a medical condition or this instruction, always ask your healthcare professional. [employer] disclaims any warranty or liability for your use of this information.      "

## 2024-05-02 LAB
ALBUMIN SERPL BCG-MCNC: 4.8 G/DL (ref 3.5–5.2)
ALP SERPL-CCNC: 75 U/L (ref 40–150)
ALT SERPL W P-5'-P-CCNC: 39 U/L (ref 0–70)
ANION GAP SERPL CALCULATED.3IONS-SCNC: 9 MMOL/L (ref 7–15)
AST SERPL W P-5'-P-CCNC: 31 U/L (ref 0–45)
BILIRUB SERPL-MCNC: 0.5 MG/DL
BUN SERPL-MCNC: 14.9 MG/DL (ref 6–20)
CALCIUM SERPL-MCNC: 9.8 MG/DL (ref 8.6–10)
CHLORIDE SERPL-SCNC: 99 MMOL/L (ref 98–107)
CHOLEST SERPL-MCNC: 167 MG/DL
CREAT SERPL-MCNC: 1.13 MG/DL (ref 0.67–1.17)
DEPRECATED HCO3 PLAS-SCNC: 27 MMOL/L (ref 22–29)
EGFRCR SERPLBLD CKD-EPI 2021: 82 ML/MIN/1.73M2
FASTING STATUS PATIENT QL REPORTED: YES
GLUCOSE SERPL-MCNC: 99 MG/DL (ref 70–99)
HDLC SERPL-MCNC: 37 MG/DL
LDLC SERPL CALC-MCNC: 97 MG/DL
NONHDLC SERPL-MCNC: 130 MG/DL
POTASSIUM SERPL-SCNC: 4.4 MMOL/L (ref 3.4–5.3)
PROT SERPL-MCNC: 7.9 G/DL (ref 6.4–8.3)
SODIUM SERPL-SCNC: 135 MMOL/L (ref 135–145)
TRIGL SERPL-MCNC: 167 MG/DL
TSH SERPL DL<=0.005 MIU/L-ACNC: 2.58 UIU/ML (ref 0.3–4.2)

## 2025-04-25 DIAGNOSIS — E78.5 HYPERLIPIDEMIA, UNSPECIFIED HYPERLIPIDEMIA TYPE: ICD-10-CM

## 2025-04-25 DIAGNOSIS — I10 PRIMARY HYPERTENSION: ICD-10-CM

## 2025-04-25 DIAGNOSIS — K21.9 GASTROESOPHAGEAL REFLUX DISEASE WITHOUT ESOPHAGITIS: ICD-10-CM

## 2025-04-28 RX ORDER — LISINOPRIL 20 MG/1
20 TABLET ORAL DAILY
Qty: 90 TABLET | Refills: 0 | Status: SHIPPED | OUTPATIENT
Start: 2025-04-28

## 2025-04-28 RX ORDER — METOPROLOL SUCCINATE 50 MG/1
50 TABLET, EXTENDED RELEASE ORAL DAILY
Qty: 90 TABLET | Refills: 0 | Status: SHIPPED | OUTPATIENT
Start: 2025-04-28

## 2025-04-28 RX ORDER — SIMVASTATIN 10 MG
10 TABLET ORAL EVERY EVENING
Qty: 90 TABLET | Refills: 0 | Status: SHIPPED | OUTPATIENT
Start: 2025-04-28

## 2025-04-28 RX ORDER — PANTOPRAZOLE SODIUM 40 MG/1
40 TABLET, DELAYED RELEASE ORAL DAILY
Qty: 90 TABLET | Refills: 0 | Status: SHIPPED | OUTPATIENT
Start: 2025-04-28

## 2025-06-07 ENCOUNTER — HEALTH MAINTENANCE LETTER (OUTPATIENT)
Age: 45
End: 2025-06-07

## 2025-06-27 DIAGNOSIS — N52.9 ERECTILE DYSFUNCTION, UNSPECIFIED ERECTILE DYSFUNCTION TYPE: ICD-10-CM

## 2025-06-27 NOTE — TELEPHONE ENCOUNTER
March 23, 2020      Maxwell Fischer MD  [address]  [facility] [address]  [facility] [address]           [place] [facility]  [address] [facility] [address]  [facility] [address]  Phone: [phone]  Fax: [phone]          Patient: Nakia Jaime   MR Number: [id number]   YOB: 1988   Date of Visit: 3/23/2020       Dear Dr. Maxwell Fischer:    Thank you for referring Nakia Jaime to me for evaluation. Attached you will find relevant portions of my assessment and plan of care.    If you have questions, please do not hesitate to call me. I look forward to following Nakia Jaime along with you.    Sincerely,    Oumou Garcia,     Enclosure  CC:  No Recipients    If you would like to receive this communication electronically, please contact [email or web address] or [phone] to request more information on [employer] Link access.    For providers and/or their staff who would like to refer a patient to [facility], please contact us through our one-stop-shop provider referral line, [facility], at [phone].    If you feel you have received this communication in error or would no longer like to receive these types of communications, please e-mail [email or web address]          Patient needs appt. 30 day bridge given. Schedule physical for further refills.    Indiana Lawson PA-C

## 2025-07-02 RX ORDER — SILDENAFIL CITRATE 20 MG/1
TABLET ORAL
Qty: 30 TABLET | Refills: 0 | Status: SHIPPED | OUTPATIENT
Start: 2025-07-02

## 2025-07-02 NOTE — TELEPHONE ENCOUNTER
Per chart review, patient currently scheduled for PE on 7/18. Routing back to PCP to consider bridge. Thanks!    Awa Muhammad RN